# Patient Record
Sex: MALE | Race: WHITE | Employment: FULL TIME | URBAN - METROPOLITAN AREA
[De-identification: names, ages, dates, MRNs, and addresses within clinical notes are randomized per-mention and may not be internally consistent; named-entity substitution may affect disease eponyms.]

---

## 2020-01-31 ENCOUNTER — HOSPITAL ENCOUNTER (OUTPATIENT)
Age: 30
Setting detail: SPECIMEN
Discharge: HOME | End: 2020-01-31

## 2020-01-31 LAB
-: NORMAL
AMORPHOUS: NORMAL
BACTERIA: NORMAL
BILIRUBIN URINE: NEGATIVE
CASTS UA: NORMAL /LPF (ref 0–8)
COLOR: YELLOW
COMMENT UA: ABNORMAL
CRYSTALS, UA: NORMAL /HPF
EPITHELIAL CELLS UA: NORMAL /HPF (ref 0–5)
GLUCOSE URINE: NEGATIVE
KETONES, URINE: NEGATIVE
LEUKOCYTE ESTERASE, URINE: ABNORMAL
MUCUS: NORMAL
NITRITE, URINE: NEGATIVE
OTHER OBSERVATIONS UA: NORMAL
PH UA: 8 (ref 5–8)
PROTEIN UA: NEGATIVE
RBC UA: NORMAL /HPF (ref 0–4)
RENAL EPITHELIAL, UA: NORMAL /HPF
SPECIFIC GRAVITY UA: 1.02 (ref 1–1.03)
TRICHOMONAS: NORMAL
TURBIDITY: CLEAR
URINE HGB: NEGATIVE
UROBILINOGEN, URINE: NORMAL
WBC UA: NORMAL /HPF (ref 0–5)
YEAST: NORMAL

## 2020-02-01 LAB
CULTURE: NO GROWTH
Lab: NORMAL
SPECIMEN DESCRIPTION: NORMAL

## 2020-02-03 LAB
C. TRACHOMATIS DNA ,URINE: ABNORMAL
N. GONORRHOEAE DNA, URINE: NEGATIVE
SOURCE: NORMAL
SPECIMEN DESCRIPTION: ABNORMAL
TRICHOMONAS VAGINALI, MOLECULAR: NEGATIVE

## 2020-02-17 ENCOUNTER — HOSPITAL ENCOUNTER (OUTPATIENT)
Age: 30
Setting detail: SPECIMEN
Discharge: HOME | End: 2020-02-17

## 2020-02-17 LAB
ALBUMIN SERPL-MCNC: 4.7 G/DL (ref 3.5–5.2)
ALBUMIN/GLOBULIN RATIO: 1.8 (ref 1–2.5)
ALP BLD-CCNC: 57 U/L (ref 40–129)
ALT SERPL-CCNC: 11 U/L (ref 5–41)
ANION GAP SERPL CALCULATED.3IONS-SCNC: 12 MMOL/L (ref 9–17)
AST SERPL-CCNC: 16 U/L
BILIRUB SERPL-MCNC: 0.28 MG/DL (ref 0.3–1.2)
BUN BLDV-MCNC: 8 MG/DL (ref 6–20)
BUN/CREAT BLD: ABNORMAL (ref 9–20)
CALCIUM SERPL-MCNC: 9.3 MG/DL (ref 8.6–10.4)
CHLORIDE BLD-SCNC: 99 MMOL/L (ref 98–107)
CO2: 26 MMOL/L (ref 20–31)
CREAT SERPL-MCNC: 0.84 MG/DL (ref 0.7–1.2)
GFR AFRICAN AMERICAN: >60 ML/MIN
GFR NON-AFRICAN AMERICAN: >60 ML/MIN
GFR SERPL CREATININE-BSD FRML MDRD: ABNORMAL ML/MIN/{1.73_M2}
GFR SERPL CREATININE-BSD FRML MDRD: ABNORMAL ML/MIN/{1.73_M2}
GLUCOSE BLD-MCNC: 85 MG/DL (ref 70–99)
HCT VFR BLD CALC: 48.4 % (ref 40.7–50.3)
HEMOGLOBIN: 15.7 G/DL (ref 13–17)
HIV AG/AB: NONREACTIVE
MCH RBC QN AUTO: 29.3 PG (ref 25.2–33.5)
MCHC RBC AUTO-ENTMCNC: 32.4 G/DL (ref 28.4–34.8)
MCV RBC AUTO: 90.5 FL (ref 82.6–102.9)
NRBC AUTOMATED: 0 PER 100 WBC
PDW BLD-RTO: 12.2 % (ref 11.8–14.4)
PLATELET # BLD: 211 K/UL (ref 138–453)
PMV BLD AUTO: 11 FL (ref 8.1–13.5)
POTASSIUM SERPL-SCNC: 4.2 MMOL/L (ref 3.7–5.3)
RBC # BLD: 5.35 M/UL (ref 4.21–5.77)
SODIUM BLD-SCNC: 137 MMOL/L (ref 135–144)
T. PALLIDUM, IGG: NONREACTIVE
TOTAL PROTEIN: 7.3 G/DL (ref 6.4–8.3)
TSH SERPL DL<=0.05 MIU/L-ACNC: 1.38 MIU/L (ref 0.3–5)
WBC # BLD: 7.1 K/UL (ref 3.5–11.3)

## 2020-02-18 LAB
HERPES SIMPLEX VIRUS 1 IGG: 0.21
HERPES SIMPLEX VIRUS 2 IGG: 0.05
HERPES TYPE 1/2 IGM COMBINED: 1.36

## 2020-02-28 ENCOUNTER — HOSPITAL ENCOUNTER (OUTPATIENT)
Age: 30
Setting detail: SPECIMEN
Discharge: HOME | End: 2020-02-28

## 2020-03-01 LAB
CULTURE: ABNORMAL
DIRECT EXAM: ABNORMAL
DIRECT EXAM: ABNORMAL
Lab: ABNORMAL
SPECIMEN DESCRIPTION: ABNORMAL

## 2020-03-02 LAB
C. TRACHOMATIS DNA ,URINE: ABNORMAL
MEDIA TYPE: NORMAL
N. GONORRHOEAE DNA, URINE: NEGATIVE
SOURCE: NORMAL
SPECIMEN DESCRIPTION: ABNORMAL
TRICHOMONAS VAGINALIS BY TRANSCRIPTION-MEDIATED AMPLIFICATION: NEGATIVE

## 2020-03-12 ENCOUNTER — HOSPITAL ENCOUNTER (OUTPATIENT)
Age: 30
Setting detail: SPECIMEN
Discharge: HOME | End: 2020-03-12

## 2020-03-13 LAB
C. TRACHOMATIS DNA ,URINE: NEGATIVE
N. GONORRHOEAE DNA, URINE: NEGATIVE
SOURCE: NORMAL
SPECIMEN DESCRIPTION: NORMAL
TRICHOMONAS VAGINALI, MOLECULAR: NEGATIVE

## 2021-12-16 ENCOUNTER — HOSPITAL ENCOUNTER (EMERGENCY)
Age: 31
Discharge: HOME | End: 2021-12-16
Payer: COMMERCIAL

## 2021-12-16 VITALS
WEIGHT: 156 LBS | OXYGEN SATURATION: 99 % | TEMPERATURE: 98.6 F | SYSTOLIC BLOOD PRESSURE: 107 MMHG | RESPIRATION RATE: 16 BRPM | HEART RATE: 60 BPM | DIASTOLIC BLOOD PRESSURE: 58 MMHG

## 2021-12-16 DIAGNOSIS — Z20.2 POSSIBLE EXPOSURE TO STD: ICD-10-CM

## 2021-12-16 DIAGNOSIS — R36.9 PENILE DISCHARGE: Primary | ICD-10-CM

## 2021-12-16 LAB
BILIRUBIN URINE: NEGATIVE
BLOOD, URINE: ABNORMAL
CHARACTER, URINE: CLEAR
CHLAMYDIA TRACHOMATIS BY RT-PCR: DETECTED
COLOR: ABNORMAL
CT/NG SOURCE: ABNORMAL
GLUCOSE URINE: NEGATIVE MG/DL
KETONES, URINE: NEGATIVE
LEUKOCYTE ESTERASE, URINE: ABNORMAL
NEISSERIA GONORRHOEAE BY RT-PCR: DETECTED
NITRITE, URINE: NEGATIVE
PH UA: 6.5 (ref 5–9)
PROTEIN UA: NEGATIVE MG/DL
SPECIFIC GRAVITY UA: 1.01 (ref 1–1.03)
UROBILINOGEN, URINE: 0.2 EU/DL (ref 0.2–1)

## 2021-12-16 PROCEDURE — 87077 CULTURE AEROBIC IDENTIFY: CPT

## 2021-12-16 PROCEDURE — 6360000002 HC RX W HCPCS: Performed by: NURSE PRACTITIONER

## 2021-12-16 PROCEDURE — 87591 N.GONORRHOEAE DNA AMP PROB: CPT

## 2021-12-16 PROCEDURE — 2500000003 HC RX 250 WO HCPCS: Performed by: NURSE PRACTITIONER

## 2021-12-16 PROCEDURE — 99203 OFFICE O/P NEW LOW 30 MIN: CPT | Performed by: NURSE PRACTITIONER

## 2021-12-16 PROCEDURE — 6370000000 HC RX 637 (ALT 250 FOR IP): Performed by: NURSE PRACTITIONER

## 2021-12-16 PROCEDURE — 87491 CHLMYD TRACH DNA AMP PROBE: CPT

## 2021-12-16 PROCEDURE — 96372 THER/PROPH/DIAG INJ SC/IM: CPT

## 2021-12-16 PROCEDURE — 87086 URINE CULTURE/COLONY COUNT: CPT

## 2021-12-16 PROCEDURE — 81003 URINALYSIS AUTO W/O SCOPE: CPT

## 2021-12-16 PROCEDURE — 99213 OFFICE O/P EST LOW 20 MIN: CPT

## 2021-12-16 RX ORDER — AZITHROMYCIN 250 MG/1
1000 TABLET, FILM COATED ORAL ONCE
Status: COMPLETED | OUTPATIENT
Start: 2021-12-16 | End: 2021-12-16

## 2021-12-16 RX ADMIN — LIDOCAINE HYDROCHLORIDE 500 MG: 10 INJECTION, SOLUTION EPIDURAL; INFILTRATION; INTRACAUDAL; PERINEURAL at 10:06

## 2021-12-16 RX ADMIN — AZITHROMYCIN MONOHYDRATE 1000 MG: 250 TABLET ORAL at 10:06

## 2021-12-16 ASSESSMENT — ENCOUNTER SYMPTOMS
NAUSEA: 0
SHORTNESS OF BREATH: 0
VOMITING: 0

## 2021-12-16 NOTE — LETTER
[address] [facility]  [address] [place] [address]  Phone: [phone]             December 20, 2021    Patient: Bradford Flores   YOB: 1990   Date of Visit: 12/16/2021       To Whom It May Concern:    Bradford Flores was seen and treated in our emergency department on 12/16/2021. He may return on 12/18/2021.       Sincerely,             Signature:__________________________________

## 2021-12-16 NOTE — ED PROVIDER NOTES
[facility]  Urgent Care Encounter       CHIEF COMPLAINT       Chief Complaint   Patient presents with    Exposure to STD       Nurses Notes reviewed and I agree except as noted in the HPI. HISTORY OF PRESENT ILLNESS   Maral Delgado is a 32 y.o. male who presents for evaluation of possible STD. Patient states that he has had white penile discharge and dysuria for the past 4 to 5 days. He denies any fever, chills, testicular pain or swelling. He states that he has been sexually active with 1 female partner over the past 4 months and has not sure of an exact STD exposure but states that he has had chlamydia before and that this feels similar. The history is provided by the patient. REVIEW OF SYSTEMS     Review of Systems   Constitutional: Negative for chills and fever. Respiratory: Negative for shortness of breath. Cardiovascular: Negative for chest pain. Gastrointestinal: Negative for nausea and vomiting. Genitourinary: Positive for dysuria and penile discharge. Negative for scrotal swelling and testicular pain. Musculoskeletal: Negative for arthralgias and myalgias. Skin: Negative for rash. Neurological: Negative for headaches. PAST MEDICAL HISTORY   History reviewed. No pertinent past medical history. SURGICALHISTORY     Patient  has a past surgical history that includes Tonsillectomy. CURRENT MEDICATIONS       Previous Medications    No medications on file       ALLERGIES     Patient is is allergic to pcn [penicillins]. Patients   There is no immunization history on file for this patient. FAMILY HISTORY     Patient's family history includes Diabetes in his mother; High Blood Pressure in his mother; No Known Problems in his father. SOCIAL HISTORY     Patient  reports that he has never smoked. He has never used smokeless tobacco. He reports that he does not drink alcohol and does not use drugs.     PHYSICAL EXAM     ED TRIAGE VITALS  BP: (!) 101/53, Temp: 98.6 °F (37 °C), Pulse: 70, Resp: 18, SpO2: 99 %,There is no height or weight on file to calculate BMI.,No LMP for male patient. Physical Exam  Vitals and nursing note reviewed. Constitutional:       General: He is not in acute distress. Appearance: He is well-developed. He is not diaphoretic. Eyes:      Conjunctiva/sclera:      Right eye: Right conjunctiva is not injected. Left eye: Left conjunctiva is not injected. Pupils: Pupils are equal.   Cardiovascular:      Rate and Rhythm: Normal rate and regular rhythm. Heart sounds: No murmur heard. Pulmonary:      Effort: Pulmonary effort is normal. No respiratory distress. Breath sounds: Normal breath sounds. Abdominal:      General: Bowel sounds are normal.      Tenderness: There is no abdominal tenderness. Genitourinary:     Comments: Exam deferred and testing was performed  Musculoskeletal:      Cervical back: Normal range of motion. Right knee: Normal range of motion. Left knee: Normal range of motion. Skin:     General: Skin is warm. Findings: No rash. Neurological:      Mental Status: He is alert and oriented to person, place, and time. Psychiatric:         Behavior: Behavior normal.         DIAGNOSTIC RESULTS     Labs:No results found for this visit on 12/16/21. IMAGING:    No orders to display         EKG:      URGENT CARE COURSE:     Vitals:    12/16/21 0924   BP: (!) 101/53   Pulse: 70   Resp: 18   Temp: 98.6 °F (37 °C)   TempSrc: Temporal   SpO2: 99%   Weight: 156 lb (70.8 kg)       Medications   cefTRIAXone (ROCEPHIN) 500 mg in lidocaine 1 % 1 mL IM Injection (has no administration in time range)   azithromycin (ZITHROMAX) tablet 1,000 mg (has no administration in time range)            PROCEDURES:  None    FINAL IMPRESSION      1. Penile discharge    2.  Possible exposure to STD          DISPOSITION/ PLAN       Patient was treated for STDs based on his concerns and is advised remain free of any sexual activity until notified of results and that he must remain free of sexual activity for 2 weeks if he is positive. He is advised to follow-up on an outpatient basis as needed if symptoms worsen and he is agreeable to plan as discussed. PATIENT REFERRED TO:  STEFFANIE Moore - CNP  [address] [facility] 2nd Floor / [facility] [address]      DISCHARGE MEDICATIONS:  New Prescriptions    No medications on file       Discontinued Medications    No medications on file       There are no discharge medications for this patient.       STEFFANIE Kennedy - CNP    (Please note that portions of this note were completed with a voice recognition program. Efforts were made to edit the dictations but occasionally words are mis-transcribed.)          STEFFANIE Kennedy - CNP  12/16/21 7960

## 2021-12-20 LAB
ORGANISM: ABNORMAL
URINE CULTURE, ROUTINE: ABNORMAL
URINE CULTURE, ROUTINE: ABNORMAL

## 2022-01-10 ENCOUNTER — HOSPITAL ENCOUNTER (EMERGENCY)
Age: 32
Discharge: HOME | End: 2022-01-10
Payer: COMMERCIAL

## 2022-01-10 VITALS
DIASTOLIC BLOOD PRESSURE: 74 MMHG | RESPIRATION RATE: 16 BRPM | OXYGEN SATURATION: 98 % | SYSTOLIC BLOOD PRESSURE: 112 MMHG | HEART RATE: 71 BPM | TEMPERATURE: 97.8 F

## 2022-01-10 DIAGNOSIS — U07.1 COVID-19: Primary | ICD-10-CM

## 2022-01-10 DIAGNOSIS — Z20.822 ENCOUNTER FOR LABORATORY TESTING FOR COVID-19 VIRUS: ICD-10-CM

## 2022-01-10 LAB
FLU A ANTIGEN: NEGATIVE
FLU B ANTIGEN: NEGATIVE
SARS-COV-2, NAA: DETECTED

## 2022-01-10 PROCEDURE — 99213 OFFICE O/P EST LOW 20 MIN: CPT

## 2022-01-10 PROCEDURE — 87804 INFLUENZA ASSAY W/OPTIC: CPT

## 2022-01-10 PROCEDURE — 99213 OFFICE O/P EST LOW 20 MIN: CPT | Performed by: NURSE PRACTITIONER

## 2022-01-10 PROCEDURE — 87635 SARS-COV-2 COVID-19 AMP PRB: CPT

## 2022-01-10 RX ORDER — ACETAMINOPHEN 325 MG/1
650 TABLET ORAL EVERY 6 HOURS PRN
Qty: 120 TABLET | Refills: 3 | COMMUNITY
Start: 2022-01-10 | End: 2022-02-08

## 2022-01-10 ASSESSMENT — PAIN DESCRIPTION - LOCATION: LOCATION: OTHER (COMMENT)

## 2022-01-10 ASSESSMENT — ENCOUNTER SYMPTOMS
COUGH: 1
VOMITING: 0
NAUSEA: 1
ABDOMINAL PAIN: 0
SORE THROAT: 0
SHORTNESS OF BREATH: 0

## 2022-01-10 ASSESSMENT — PAIN DESCRIPTION - PAIN TYPE: TYPE: ACUTE PAIN

## 2022-01-10 ASSESSMENT — PAIN SCALES - GENERAL: PAINLEVEL_OUTOF10: 4

## 2022-01-10 ASSESSMENT — PAIN DESCRIPTION - FREQUENCY: FREQUENCY: CONTINUOUS

## 2022-01-10 ASSESSMENT — PAIN DESCRIPTION - DESCRIPTORS: DESCRIPTORS: ACHING

## 2022-01-10 NOTE — ED PROVIDER NOTES
[facility] 36  Urgent Care Encounter       CHIEF COMPLAINT       Chief Complaint   Patient presents with    Chills     onset Wednesday     Cough    Headache     onset Friday        Nurses Notes reviewed and I agree except as noted in the HPI. HISTORY OF PRESENT ILLNESS   Elizabeth Iglesia is a 32 y.o. male who presents with complaints of chills, cough, headache, body ache, and sweating. This is a new problem. He states his symptoms started Wednesday morning when he woke up. Symptoms slightly worsened on Friday. He called off work for the weekend. He states he did have nausea which resolved yesterday as well as his headache. His symptoms have been improving since that time. He denies any current fever, chills, shortness of breath, or chest pain. He has not taken anything for treatment    The history is provided by the patient. REVIEW OF SYSTEMS     Review of Systems   Constitutional: Positive for chills and fatigue. Negative for fever. HENT: Positive for congestion. Negative for sore throat. Respiratory: Positive for cough. Negative for shortness of breath. Cardiovascular: Negative for chest pain. Gastrointestinal: Positive for nausea. Negative for abdominal pain and vomiting. Musculoskeletal: Positive for myalgias. Neurological: Positive for weakness and headaches. PAST MEDICAL HISTORY   History reviewed. No pertinent past medical history. SURGICALHISTORY     Patient  has a past surgical history that includes Tonsillectomy. CURRENT MEDICATIONS       Discharge Medication List as of 1/10/2022  4:55 PM          ALLERGIES     Patient is is allergic to pcn [penicillins]. Patients   There is no immunization history on file for this patient. FAMILY HISTORY     Patient's family history includes Diabetes in his mother; High Blood Pressure in his mother; No Known Problems in his father. SOCIAL HISTORY     Patient  reports that he has never smoked.  He has 01/10/2022 04:55:44 PM     Discussed with the patient that exam is consistent with a viral illness and that I believe testing for coronavirus and influenza is warranted at this time. Test was completed during visit today and patient is advised to quarantine. Rapid influenza test was negative. Patient is advised to follow-up as directed by the health department. Advised to rest and hydrate and use over-the-counter antipyretic medications as needed for fever or body aches. Patient is advised to present to the ER for any worsening symptoms and is agreeable to plan as discussed.       PATIENT REFERRED TO:  STEFFANIE Irizarry CNP  [address] [place] [address] / [place] [id number]      DISCHARGE MEDICATIONS:  Discharge Medication List as of 1/10/2022  4:55 PM      START taking these medications    Details   acetaminophen (AMINOFEN) 325 MG tablet Take 2 tablets by mouth every 6 hours as needed for Pain, Disp-120 tablet, R-[id number]             Discharge Medication List as of 1/10/2022  4:55 PM          Discharge Medication List as of 1/10/2022  4:55 PM          STEFFANIE Rizo CNP    (Please note that portions of this note were completed with a voice recognition program. Efforts were made to edit the dictations but occasionally words are mis-transcribed.)            STEFFANIE Rizo CNP  01/10/22 0443

## 2022-01-10 NOTE — Clinical Note
Artemio Belcher was seen and treated in our emergency department on 1/10/2022. He may return to work on 01/11/2022. If you have any questions or concerns, please don't hesitate to call.       Jemma Jeffers, STEFFANIE - CNP

## 2022-02-08 ENCOUNTER — APPOINTMENT (OUTPATIENT)
Dept: CT IMAGING | Age: 32
End: 2022-02-08
Payer: COMMERCIAL

## 2022-02-08 ENCOUNTER — HOSPITAL ENCOUNTER (EMERGENCY)
Age: 32
Discharge: HOME | End: 2022-02-08
Payer: COMMERCIAL

## 2022-02-08 VITALS
HEART RATE: 62 BPM | WEIGHT: 153 LBS | BODY MASS INDEX: 20.28 KG/M2 | DIASTOLIC BLOOD PRESSURE: 75 MMHG | TEMPERATURE: 97.6 F | OXYGEN SATURATION: 98 % | SYSTOLIC BLOOD PRESSURE: 114 MMHG | RESPIRATION RATE: 18 BRPM | HEIGHT: 73 IN

## 2022-02-08 DIAGNOSIS — K29.00 ACUTE GASTRITIS WITHOUT HEMORRHAGE, UNSPECIFIED GASTRITIS TYPE: ICD-10-CM

## 2022-02-08 DIAGNOSIS — R10.13 ABDOMINAL PAIN, EPIGASTRIC: Primary | ICD-10-CM

## 2022-02-08 LAB
ALBUMIN SERPL-MCNC: 4.7 G/DL (ref 3.5–5.1)
ALP BLD-CCNC: 55 U/L (ref 38–126)
ALT SERPL-CCNC: 7 U/L (ref 11–66)
ANION GAP SERPL CALCULATED.3IONS-SCNC: 10 MEQ/L (ref 8–16)
AST SERPL-CCNC: 16 U/L (ref 5–40)
BASOPHILS # BLD: 0.3 %
BASOPHILS ABSOLUTE: 0 THOU/MM3 (ref 0–0.1)
BILIRUB SERPL-MCNC: 0.2 MG/DL (ref 0.3–1.2)
BUN BLDV-MCNC: 9 MG/DL (ref 7–22)
CALCIUM SERPL-MCNC: 9.1 MG/DL (ref 8.5–10.5)
CHLORIDE BLD-SCNC: 106 MEQ/L (ref 98–111)
CO2: 25 MEQ/L (ref 23–33)
CREAT SERPL-MCNC: 0.9 MG/DL (ref 0.4–1.2)
EOSINOPHIL # BLD: 3.6 %
EOSINOPHILS ABSOLUTE: 0.2 THOU/MM3 (ref 0–0.4)
ERYTHROCYTE [DISTWIDTH] IN BLOOD BY AUTOMATED COUNT: 12.3 % (ref 11.5–14.5)
ERYTHROCYTE [DISTWIDTH] IN BLOOD BY AUTOMATED COUNT: 39.4 FL (ref 35–45)
GFR SERPL CREATININE-BSD FRML MDRD: > 90 ML/MIN/1.73M2
GLUCOSE BLD-MCNC: 117 MG/DL (ref 70–108)
HCT VFR BLD CALC: 42.4 % (ref 42–52)
HEMOGLOBIN: 14.7 GM/DL (ref 14–18)
IMMATURE GRANS (ABS): 0 THOU/MM3 (ref 0–0.07)
IMMATURE GRANULOCYTES: 0 %
LIPASE: 24.8 U/L (ref 5.6–51.3)
LYMPHOCYTES # BLD: 25.4 %
LYMPHOCYTES ABSOLUTE: 1.7 THOU/MM3 (ref 1–4.8)
MCH RBC QN AUTO: 30.4 PG (ref 26–33)
MCHC RBC AUTO-ENTMCNC: 34.7 GM/DL (ref 32.2–35.5)
MCV RBC AUTO: 87.8 FL (ref 80–94)
MONOCYTES # BLD: 11.1 %
MONOCYTES ABSOLUTE: 0.7 THOU/MM3 (ref 0.4–1.3)
NUCLEATED RED BLOOD CELLS: 0 /100 WBC
OSMOLALITY CALCULATION: 281 MOSMOL/KG (ref 275–300)
PLATELET # BLD: 213 THOU/MM3 (ref 130–400)
PMV BLD AUTO: 10.2 FL (ref 9.4–12.4)
POTASSIUM REFLEX MAGNESIUM: 4 MEQ/L (ref 3.5–5.2)
RBC # BLD: 4.83 MILL/MM3 (ref 4.7–6.1)
SEG NEUTROPHILS: 59.6 %
SEGMENTED NEUTROPHILS ABSOLUTE COUNT: 4 THOU/MM3 (ref 1.8–7.7)
SODIUM BLD-SCNC: 141 MEQ/L (ref 135–145)
TOTAL PROTEIN: 7.2 G/DL (ref 6.1–8)
WBC # BLD: 6.7 THOU/MM3 (ref 4.8–10.8)

## 2022-02-08 PROCEDURE — 96375 TX/PRO/DX INJ NEW DRUG ADDON: CPT

## 2022-02-08 PROCEDURE — 99284 EMERGENCY DEPT VISIT MOD MDM: CPT

## 2022-02-08 PROCEDURE — 6370000000 HC RX 637 (ALT 250 FOR IP): Performed by: NURSE PRACTITIONER

## 2022-02-08 PROCEDURE — 74177 CT ABD & PELVIS W/CONTRAST: CPT

## 2022-02-08 PROCEDURE — 6360000004 HC RX CONTRAST MEDICATION: Performed by: NURSE PRACTITIONER

## 2022-02-08 PROCEDURE — 96374 THER/PROPH/DIAG INJ IV PUSH: CPT

## 2022-02-08 PROCEDURE — 6360000002 HC RX W HCPCS: Performed by: NURSE PRACTITIONER

## 2022-02-08 PROCEDURE — 83690 ASSAY OF LIPASE: CPT

## 2022-02-08 PROCEDURE — 85025 COMPLETE CBC W/AUTO DIFF WBC: CPT

## 2022-02-08 PROCEDURE — 36415 COLL VENOUS BLD VENIPUNCTURE: CPT

## 2022-02-08 PROCEDURE — 80053 COMPREHEN METABOLIC PANEL: CPT

## 2022-02-08 RX ORDER — ONDANSETRON 2 MG/ML
4 INJECTION INTRAMUSCULAR; INTRAVENOUS ONCE
Status: COMPLETED | OUTPATIENT
Start: 2022-02-08 | End: 2022-02-08

## 2022-02-08 RX ORDER — MORPHINE SULFATE 2 MG/ML
2 INJECTION, SOLUTION INTRAMUSCULAR; INTRAVENOUS ONCE
Status: COMPLETED | OUTPATIENT
Start: 2022-02-08 | End: 2022-02-08

## 2022-02-08 RX ORDER — ONDANSETRON 4 MG/1
4 TABLET, ORALLY DISINTEGRATING ORAL EVERY 8 HOURS PRN
Qty: 20 TABLET | Refills: 0 | Status: SHIPPED | OUTPATIENT
Start: 2022-02-08 | End: 2022-05-05

## 2022-02-08 RX ORDER — OMEPRAZOLE 40 MG/1
40 CAPSULE, DELAYED RELEASE ORAL
Qty: 30 CAPSULE | Refills: 0 | Status: SHIPPED | OUTPATIENT
Start: 2022-02-08 | End: 2022-08-18 | Stop reason: ALTCHOICE

## 2022-02-08 RX ADMIN — MORPHINE SULFATE 2 MG: 2 INJECTION, SOLUTION INTRAMUSCULAR; INTRAVENOUS at 13:44

## 2022-02-08 RX ADMIN — IOPAMIDOL 80 ML: 755 INJECTION, SOLUTION INTRAVENOUS at 14:18

## 2022-02-08 RX ADMIN — LIDOCAINE HYDROCHLORIDE: 20 SOLUTION ORAL; TOPICAL at 16:04

## 2022-02-08 RX ADMIN — ONDANSETRON 4 MG: 2 INJECTION INTRAMUSCULAR; INTRAVENOUS at 13:44

## 2022-02-08 ASSESSMENT — PAIN SCALES - GENERAL
PAINLEVEL_OUTOF10: 4
PAINLEVEL_OUTOF10: 5
PAINLEVEL_OUTOF10: 5

## 2022-02-08 ASSESSMENT — ENCOUNTER SYMPTOMS
BLOOD IN STOOL: 0
ABDOMINAL PAIN: 1
SHORTNESS OF BREATH: 0
NAUSEA: 1
VOMITING: 0
DIARRHEA: 1
CONSTIPATION: 0
COLOR CHANGE: 0
ABDOMINAL DISTENTION: 0

## 2022-02-08 ASSESSMENT — PAIN DESCRIPTION - DESCRIPTORS: DESCRIPTORS: DULL;PRESSURE

## 2022-02-08 ASSESSMENT — PAIN DESCRIPTION - LOCATION
LOCATION: ABDOMEN
LOCATION: ABDOMEN

## 2022-02-08 ASSESSMENT — PAIN DESCRIPTION - PAIN TYPE: TYPE: ACUTE PAIN

## 2022-02-08 ASSESSMENT — PAIN DESCRIPTION - FREQUENCY
FREQUENCY: INTERMITTENT
FREQUENCY: INTERMITTENT

## 2022-02-08 NOTE — ED NOTES
ED nurse-to-nurse bedside report    Chief Complaint   Patient presents with    Abdominal Pain      LOC: alert and orientated to name, place, date  Vital signs   Vitals:    02/08/22 1235   BP: 113/78   Pulse: 63   Resp: 16   Temp: 97.6 °F (36.4 °C)   TempSrc: Oral   SpO2: 98%   Weight: 153 lb (69.4 kg)   Height: 6' 1\" (1.854 m)      Pain:    Pain Interventions: none  Pain Goal: 2/10  Oxygen: No    Current needs required RA   Telemetry: No  LDAs:   Peripheral IV 02/08/22 Left Antecubital (Active)   Site Assessment Clean;Dry; Intact 02/08/22 1244   Line Status Blood return noted;Specimen collected; Flushed 02/08/22 1244   Dressing Status Clean;Dry; Intact 02/08/22 1244   Dressing Intervention New 02/08/22 1244     Continuous Infusions:   Mobility: Independent  Lovell Fall Risk Score: No flowsheet data found.   Report given to: Hugo Grande RN  02/08/22 6545

## 2022-02-08 NOTE — ED NOTES
Pt remains stable, resting in bed. Pt reminded urine sample is needed.      Judi Hinds RN  02/08/22 8349

## 2022-02-08 NOTE — ED NOTES
Pt is being DC home in stable condition.  DC instructions, follow up and prescriptions reviewed, pt verbalized understanding with no question for this RN     Mary Fine RN  02/08/22 1577

## 2022-02-08 NOTE — Clinical Note
Ana Kelsey was seen and treated in our emergency department on 2/8/2022. He may return to work on 02/09/2022. If you have any questions or concerns, please don't hesitate to call.       Elsa Johnson, STEFFANIE - CNP

## 2022-02-08 NOTE — Clinical Note
Charmaine Alvarez was seen and treated in our emergency department on 2/8/2022. He may return to work on 02/09/2022. If you have any questions or concerns, please don't hesitate to call.       Iliana Morgan, STEFFANIE - CNP

## 2022-02-08 NOTE — ED PROVIDER NOTES
 Number of children: None    Years of education: None    Highest education level: None   Occupational History    None   Tobacco Use    Smoking status: Never Smoker    Smokeless tobacco: Never Used   Vaping Use    Vaping Use: Every day    Substances: Nicotine   Substance and Sexual Activity    Alcohol use: Never    Drug use: Never    Sexual activity: Never   Other Topics Concern    None   Social History Narrative    None     Social Determinants of Health     Financial Resource Strain:     Difficulty of Paying Living Expenses: Not on file   Food Insecurity:     Worried About Running Out of Food in the Last Year: Not on file    Glen of Food in the Last Year: Not on file   Transportation Needs:     Lack of Transportation (Medical): Not on file    Lack of Transportation (Non-Medical): Not on file   Physical Activity:     Days of Exercise per Week: Not on file    Minutes of Exercise per Session: Not on file   Stress:     Feeling of Stress : Not on file   Social Connections:     Frequency of Communication with Friends and Family: Not on file    Frequency of Social Gatherings with Friends and Family: Not on file    Attends [employer]ism Services: Not on file    Active Member of [address] [place] [address] or Organizations: Not on file    Attends Club or Organization Meetings: Not on file    Marital Status: Not on file   Intimate Partner Violence:     Fear of Current or Ex-Partner: Not on file    Emotionally Abused: Not on file    Physically Abused: Not on file    Sexually Abused: Not on file   Housing Stability:     Unable to Pay for Housing in the Last Year: Not on file    Number of [place] in the Last Year: Not on file    Unstable Housing in the Last Year: Not on file       REVIEW OF SYSTEMS     Review of Systems   Constitutional: Positive for appetite change. Negative for chills, diaphoresis, fatigue and fever. Respiratory: Negative for shortness of breath.     Cardiovascular: Negative for chest pain and palpitations. Gastrointestinal: Positive for abdominal pain, diarrhea and nausea. Negative for abdominal distention, blood in stool, constipation and vomiting. Skin: Negative for color change. Neurological: Positive for dizziness and light-headedness. Negative for syncope and headaches. Except as noted above the remainder of the review of systems was reviewed and is negative. SCREENINGS                        PHYSICAL EXAM    (up to 7 for level 4, 8 or more for level 5)     ED Triage Vitals [02/08/22 1235]   BP Temp Temp Source Pulse Resp SpO2 Height Weight   113/78 97.6 °F (36.4 °C) Oral 63 16 98 % 6' 1\" (1.854 m) 153 lb (69.4 kg)       Physical Exam  Constitutional:       Appearance: He is well-developed. HENT:      Head: Normocephalic and atraumatic. Cardiovascular:      Rate and Rhythm: Normal rate and regular rhythm. Pulmonary:      Effort: Pulmonary effort is normal.      Breath sounds: Normal breath sounds. Abdominal:      General: Abdomen is flat. Bowel sounds are normal. There is no distension. Palpations: Abdomen is soft. Tenderness: There is abdominal tenderness in the epigastric area and left upper quadrant. There is no guarding or rebound. Positive signs include Whitman's sign. Negative signs include McBurney's sign, psoas sign and obturator sign. Skin:     General: Skin is warm and dry. Capillary Refill: Capillary refill takes less than 2 seconds. Neurological:      General: No focal deficit present. Mental Status: He is alert and oriented to person, place, and time. Psychiatric:         Behavior: Behavior is cooperative. DIAGNOSTIC RESULTS     EKG:(none if blank)  All EKGs are interpreted by the Emergency Department Physician who either signs or Co-signs this chart in the absence of a cardiologist.        RADIOLOGY: (none if blank)   I directly visualized the following images and reviewed the radiologist interpretations.   Interpretation per the Radiologist below, if available at the time of this note:  CT ABDOMEN PELVIS W IV CONTRAST Additional Contrast? None   Final Result   1. No acute bowel obstruction or acute inflammatory bowel process   2. Grade 1 spondylolisthesis of L5 as a result of bilateral spondylolysis. 3. Bilateral spondylolysis at L4. **This report has been created using voice recognition software. It may contain minor errors which are inherent in voice recognition technology. **      Final report electronically signed by Dr Fran Colin on 2/8/2022 2:55 PM          LABS:  Labs Reviewed   COMPREHENSIVE METABOLIC PANEL W/ REFLEX TO MG FOR LOW K - Abnormal; Notable for the following components:       Result Value    Glucose 117 (*)     Total Bilirubin 0.2 (*)     ALT 7 (*)     All other components within normal limits   CBC WITH AUTO DIFFERENTIAL   LIPASE   ANION GAP   GLOMERULAR FILTRATION RATE, ESTIMATED   OSMOLALITY   URINE RT REFLEX TO CULTURE       All other labs were within normal range or not returned as of this dictation. Please note, any cultures that may have been sent were not resulted at the time of this patient visit. EMERGENCY DEPARTMENT COURSE and Medical Decision Making:     Vitals:    Vitals:    02/08/22 1235 02/08/22 1321 02/08/22 1547   BP: 113/78 110/78 113/78   Pulse: 63 67 71   Resp: 16 18    Temp: 97.6 °F (36.4 °C)     TempSrc: Oral     SpO2: 98% 97% 98%   Weight: 153 lb (69.4 kg)     Height: 6' 1\" (1.854 m)         PROCEDURES: (None if blank)  Procedures    ED Course as of 02/08/22 1657   Tue Feb 08, 2022   1620 Reviewed case with Dr Kaela Melissa.  OK for discharge and follow-up with GI [NW]      ED Course User Index  [NW] STEFFANIE Castillo - CNP     MDM  Number of Diagnoses or Management Options  Abdominal pain, epigastric: new, needed workup     Amount and/or Complexity of Data Reviewed  Clinical lab tests: ordered and reviewed  Tests in the radiology section of CPT®: ordered and reviewed  Tests in the medicine section of CPT®: ordered and reviewed  Review and summarize past medical records: yes  Discuss the patient with other providers: yes  Independent visualization of images, tracings, or specimens: yes    Risk of Complications, Morbidity, and/or Mortality  Presenting problems: minimal  Diagnostic procedures: low  Management options: low    Critical Care  Total time providing critical care: < 30 minutes    Patient presents to ER with complaint of Carmine, vomiting and epigastric pain. Patient is concerned due to family history of diabetes and he is concerned for \"problems with his organs\". CTA of the abdomen was reassuring. Labs are reassuring. Patient was given Zofran which helped with his nausea. Patient was given a GI cocktail and this did help with the pain some. Patient be discharged home with follow-up with GI. Patient will be given prescription for omeprazole and for Zofran ODT. Patient return to ER for worsening symptoms  Strict return precautions and follow up instructions were discussed with the patient with which the patient agrees    ED Medications administered this visit:    Medications   ondansetron (ZOFRAN) injection 4 mg (4 mg IntraVENous Given 2/8/22 1344)   morphine (PF) injection 2 mg (2 mg IntraVENous Given 2/8/22 1344)   iopamidol (ISOVUE-370) 76 % injection 80 mL (80 mLs IntraVENous Given 2/8/22 1418)   aluminum & magnesium hydroxide-simethicone (MAALOX) 30 mL, lidocaine viscous hcl (XYLOCAINE) 5 mL (GI COCKTAIL) ( Oral Given 2/8/22 1604)         FINAL IMPRESSION      1. Abdominal pain, epigastric    2.  Acute gastritis without hemorrhage, unspecified gastritis type          DISPOSITION/PLAN   DISPOSITION    Discharged    PATIENT REFERRED TO:  MD Venessa Sun [address] [place] [address] [phone]    Schedule an appointment as soon as possible for a visit         DISCHARGE MEDICATIONS:  New Prescriptions    OMEPRAZOLE (PRILOSEC) 40 MG DELAYED RELEASE CAPSULE Take 1 capsule by mouth every morning (before breakfast)    ONDANSETRON (ZOFRAN ODT) 4 MG DISINTEGRATING TABLET    Take 1 tablet by mouth every 8 hours as needed for Nausea              STEFFANIE Portillo CNP (electronically signed)            STEFFANIE Portillo CNP  [facility]22 [facility]  STEFFANIE Lopez CNP  02/08/22 [address] STEFFANIE Banks CNP  [facility]22 [facility]  STEFFANIE Lopez CNP  02/08/22 2012

## 2022-02-08 NOTE — ED TRIAGE NOTES
Pt ambulatory to ER from [facility] with c/o LUQ pain x 1 week. Pt describes pain as a dull pressure that comes in waves. Pt used medical marijuana 'oils' for pain. Pt reports diarrhea today and nausea.

## 2022-05-05 ENCOUNTER — HOSPITAL ENCOUNTER (EMERGENCY)
Age: 32
Discharge: HOME | End: 2022-05-05
Payer: COMMERCIAL

## 2022-05-05 VITALS
BODY MASS INDEX: 20.01 KG/M2 | TEMPERATURE: 97.6 F | OXYGEN SATURATION: 98 % | SYSTOLIC BLOOD PRESSURE: 109 MMHG | HEART RATE: 68 BPM | RESPIRATION RATE: 16 BRPM | WEIGHT: 151 LBS | HEIGHT: 73 IN | DIASTOLIC BLOOD PRESSURE: 68 MMHG

## 2022-05-05 DIAGNOSIS — J06.9 VIRAL URI WITH COUGH: Primary | ICD-10-CM

## 2022-05-05 PROCEDURE — 99213 OFFICE O/P EST LOW 20 MIN: CPT | Performed by: NURSE PRACTITIONER

## 2022-05-05 PROCEDURE — 99213 OFFICE O/P EST LOW 20 MIN: CPT

## 2022-05-05 RX ORDER — GUAIFENESIN, PSEUDOEPHEDRINE HYDROCHLORIDE 600; 60 MG/1; MG/1
1 TABLET, EXTENDED RELEASE ORAL EVERY 12 HOURS PRN
Qty: 14 TABLET | Refills: 0 | Status: SHIPPED | OUTPATIENT
Start: 2022-05-05 | End: 2022-05-19

## 2022-05-05 RX ORDER — FLUTICASONE PROPIONATE 50 MCG
1 SPRAY, SUSPENSION (ML) NASAL DAILY
Qty: 16 G | Refills: 0 | Status: SHIPPED | OUTPATIENT
Start: 2022-05-05 | End: 2022-08-18 | Stop reason: ALTCHOICE

## 2022-05-05 RX ORDER — CETIRIZINE HYDROCHLORIDE 10 MG/1
10 TABLET ORAL DAILY
Qty: 30 TABLET | Refills: 0 | Status: SHIPPED | OUTPATIENT
Start: 2022-05-05 | End: 2022-06-04

## 2022-05-05 ASSESSMENT — ENCOUNTER SYMPTOMS
COUGH: 1
SORE THROAT: 1
RHINORRHEA: 1
CHEST TIGHTNESS: 0
SHORTNESS OF BREATH: 0
DIARRHEA: 0
NAUSEA: 0
VOMITING: 0

## 2022-05-05 ASSESSMENT — PAIN DESCRIPTION - LOCATION: LOCATION: THROAT

## 2022-05-05 ASSESSMENT — PAIN DESCRIPTION - DESCRIPTORS: DESCRIPTORS: SORE

## 2022-05-05 ASSESSMENT — PAIN SCALES - GENERAL: PAINLEVEL_OUTOF10: 7

## 2022-05-05 ASSESSMENT — PAIN - FUNCTIONAL ASSESSMENT: PAIN_FUNCTIONAL_ASSESSMENT: 0-10

## 2022-05-05 NOTE — ED PROVIDER NOTES
[facility] 36  Urgent Care Encounter       CHIEF COMPLAINT       Chief Complaint   Patient presents with    Cough    Pharyngitis    Nasal Congestion       Nurses Notes reviewed and I agree except as noted in the HPI. HISTORY OF PRESENT ILLNESS   Brown Foley is a 32 y.o. male who presents to the [facility] urgent care for evaluation of cough. He reports his symptoms started yesterday. He reports associated symptoms of nasal congestion, rhinorrhea, postnasal drainage, pharyngitis and cough. He denies fever or chills. Denies nausea, vomiting, diarrhea. Denies headache, dizziness, or blurred vision. The history is provided by the patient. No  was used. REVIEW OF SYSTEMS     Review of Systems   Constitutional: Negative for activity change, appetite change, chills, fatigue and fever. HENT: Positive for congestion, postnasal drip, rhinorrhea and sore throat. Negative for ear discharge and ear pain. Respiratory: Positive for cough. Negative for chest tightness and shortness of breath. Cardiovascular: Negative for chest pain. Gastrointestinal: Negative for diarrhea, nausea and vomiting. Genitourinary: Negative for dysuria. Skin: Negative for rash. Allergic/Immunologic: Negative for environmental allergies and food allergies. Neurological: Negative for dizziness and headaches. PAST MEDICAL HISTORY         Diagnosis Date    COVID-19 01/10/2022       SURGICALHISTORY     Patient  has a past surgical history that includes Tonsillectomy. CURRENT MEDICATIONS       Discharge Medication List as of 5/5/2022 12:13 PM      CONTINUE these medications which have NOT CHANGED    Details   omeprazole (PRILOSEC) 40 MG delayed release capsule Take 1 capsule by mouth every morning (before breakfast), Disp-30 capsule, R-0Normal             ALLERGIES     Patient is is allergic to pcn [penicillins].     Patients   There is no immunization history on file for this patient. FAMILY HISTORY     Patient's family history includes Diabetes in his mother; High Blood Pressure in his mother; No Known Problems in his father. SOCIAL HISTORY     Patient  reports that he has never smoked. He has never used smokeless tobacco. He reports that he does not drink alcohol and does not use drugs. PHYSICAL EXAM     ED TRIAGE VITALS  BP: 109/68, Temp: 97.6 °F (36.4 °C), Pulse: 68, Resp: 16, SpO2: 98 %,Estimated body mass index is 19.92 kg/m² as calculated from the following:    Height as of this encounter: 6' 1\" (1.854 m). Weight as of this encounter: 151 lb (68.5 kg). ,No LMP for male patient. Physical Exam  Vitals and nursing note reviewed. Constitutional:       General: He is not in acute distress. Appearance: Normal appearance. He is not ill-appearing, toxic-appearing or diaphoretic. HENT:      Head: Normocephalic. Right Ear: Ear canal and external ear normal.      Left Ear: Ear canal and external ear normal.      Nose: Nose normal. No congestion or rhinorrhea. Mouth/Throat:      Mouth: Mucous membranes are moist.      Pharynx: Oropharynx is clear. No oropharyngeal exudate or posterior oropharyngeal erythema. Cardiovascular:      Rate and Rhythm: Normal rate. Pulses: Normal pulses. Pulmonary:      Effort: Pulmonary effort is normal. No respiratory distress. Breath sounds: No stridor. No wheezing or rhonchi. Abdominal:      General: Abdomen is flat. Bowel sounds are normal.      Palpations: Abdomen is soft. Musculoskeletal:         General: No swelling or tenderness. Normal range of motion. Cervical back: Normal range of motion. Neurological:      General: No focal deficit present. Mental Status: He is alert and oriented to person, place, and time.    Psychiatric:         Mood and Affect: Mood normal.         Behavior: Behavior normal.         DIAGNOSTIC RESULTS     Labs:No results found for this visit on 05/05/22. IMAGING:    No orders to display         EKG: None      URGENT CARE COURSE:     Vitals:    05/05/22 1200   BP: 109/68   Pulse: 68   Resp: 16   Temp: 97.6 °F (36.4 °C)   SpO2: 98%   Weight: 151 lb (68.5 kg)   Height: 6' 1\" (1.854 m)       Medications - No data to display         PROCEDURES:  None    FINAL IMPRESSION      1. Viral URI with cough          DISPOSITION/ PLAN     Patient seen and evaluated for the above symptoms. Assessment consistent with likely acute viral URI with cough. He is provided a prescription for Zyrtec, Flonase, Mucinex D. Instructed follow-up with his PCP in 3 to 5 days and worsening symptoms. Instructed use over-the-counter Tylenol and Motrin for pain or fever. He is agreeable to above plan and denies questions or concerns at this time.       PATIENT REFERRED TO:  STEFFANIE Borjas CNP  [address] / [facility] [address]      DISCHARGE MEDICATIONS:  Discharge Medication List as of 5/5/2022 12:13 PM      START taking these medications    Details   cetirizine (ZYRTEC) 10 MG tablet Take 1 tablet by mouth daily, Disp-30 tablet, R-0Normal      fluticasone (FLONASE) 50 MCG/ACT nasal spray 1 spray by Each Nostril route daily, Disp-16 g, R-0Normal      pseudoephedrine-guaiFENesin (MUCINEX D)  MG per extended release tablet Take 1 tablet by mouth every 12 hours as needed for Congestion, Disp-14 tablet, R-0Normal             Discharge Medication List as of 5/5/2022 12:13 PM      STOP taking these medications       ondansetron (ZOFRAN ODT) 4 MG disintegrating tablet Comments:   Reason for Stopping:               Discharge Medication List as of 5/5/2022 12:13 PM          STEFFANIE Hampton CNP    (Please note that portions of this note were completed with a voice recognition program. Efforts were made to edit the dictations but occasionally words are mis-transcribed.)           STEFFANIE Hampton CNP  05/05/22 1329

## 2022-05-05 NOTE — ED NOTES
Pt ambulatory to room 4 with complaints of cough congestion and sore throat     Michael Jarvis RN  05/05/22 7675

## 2022-05-05 NOTE — Clinical Note
Darcie Vasques was seen and treated in our emergency department on 5/5/2022. He may return to work on 05/07/2022. May be off school or work one to two days if needed. If you have any questions or concerns, please don't hesitate to call.       Michael Em, APRN - CNP

## 2022-08-18 ENCOUNTER — HOSPITAL ENCOUNTER (EMERGENCY)
Age: 32
Discharge: HOME | End: 2022-08-18
Payer: COMMERCIAL

## 2022-08-18 VITALS
RESPIRATION RATE: 18 BRPM | HEART RATE: 72 BPM | TEMPERATURE: 98 F | BODY MASS INDEX: 20.45 KG/M2 | SYSTOLIC BLOOD PRESSURE: 117 MMHG | OXYGEN SATURATION: 99 % | DIASTOLIC BLOOD PRESSURE: 61 MMHG | WEIGHT: 155 LBS

## 2022-08-18 DIAGNOSIS — U07.1 COVID-19: Primary | ICD-10-CM

## 2022-08-18 LAB — SARS-COV-2, NAA: DETECTED

## 2022-08-18 PROCEDURE — 99213 OFFICE O/P EST LOW 20 MIN: CPT

## 2022-08-18 PROCEDURE — 99213 OFFICE O/P EST LOW 20 MIN: CPT | Performed by: NURSE PRACTITIONER

## 2022-08-18 PROCEDURE — 87635 SARS-COV-2 COVID-19 AMP PRB: CPT

## 2022-08-18 RX ORDER — CLINDAMYCIN HYDROCHLORIDE 300 MG/1
300 CAPSULE ORAL 3 TIMES DAILY
COMMUNITY
End: 2022-10-20 | Stop reason: ALTCHOICE

## 2022-08-18 RX ORDER — ONDANSETRON 4 MG/1
4 TABLET, ORALLY DISINTEGRATING ORAL EVERY 8 HOURS PRN
Qty: 12 TABLET | Refills: 0 | Status: SHIPPED | OUTPATIENT
Start: 2022-08-18 | End: 2022-08-22

## 2022-08-18 ASSESSMENT — ENCOUNTER SYMPTOMS
COUGH: 0
SHORTNESS OF BREATH: 0
SORE THROAT: 1

## 2022-08-18 NOTE — ED TRIAGE NOTES
Patient to room with c/o headache, sore throat, and chills upon waking this morning. Nasopharyngeal COVID swab obtained. Patient tolerated well. Requests work excuse.

## 2022-08-18 NOTE — LETTER
Adrian Caroline was seen and treated in our emergency department on 8/18/2022. He may return to work on 08/24/2022. If you have any questions or concerns, please don't hesitate to call.       Eber January, APRN - CNP

## 2022-08-18 NOTE — ED PROVIDER NOTES
[facility] 36  Urgent Care Encounter       CHIEF COMPLAINT       Chief Complaint   Patient presents with    Headache     Sore throat, chills       Nurses Notes reviewed and I agree except as noted in the HPI. HISTORY OF PRESENT ILLNESS   Kain Silverio is a 32 y.o. male who presents complaints of a headache, sore throat, and chills. His symptoms started this morning around 5 hours ago. This is a new problem. He denies any shortness of breath or chest pain. He has not taken anything for the headaches or symptom management. He denies any known exposure to illness. The history is provided by the patient. REVIEW OF SYSTEMS     Review of Systems   Constitutional:  Positive for chills and fatigue. Negative for fever. HENT:  Positive for sore throat. Negative for congestion. Respiratory:  Negative for cough and shortness of breath. Cardiovascular:  Negative for chest pain. Musculoskeletal:  Positive for myalgias. Neurological:  Positive for headaches. PAST MEDICAL HISTORY         Diagnosis Date    COVID-19 01/10/2022       SURGICALHISTORY     Patient  has a past surgical history that includes Tonsillectomy. CURRENT MEDICATIONS       Discharge Medication List as of 8/18/2022  2:21 PM        CONTINUE these medications which have NOT CHANGED    Details   clindamycin (CLEOCIN) 300 MG capsule Take 300 mg by mouth 3 times dailyHistorical Med             ALLERGIES     Patient is is allergic to pcn [penicillins]. Patients   There is no immunization history on file for this patient. FAMILY HISTORY     Patient's family history includes Diabetes in his mother; High Blood Pressure in his mother; No Known Problems in his father. SOCIAL HISTORY     Patient  reports that he has never smoked. He has never used smokeless tobacco. He reports that he does not drink alcohol and does not use drugs.     PHYSICAL EXAM     ED TRIAGE VITALS  BP: 117/61, Temp: 98 °F (36.7 °C), Heart Rate: 72, Resp: 18, SpO2: 99 %,Estimated body mass index is 20.45 kg/m² as calculated from the following:    Height as of 5/5/22: 6' 1\" (1.854 m). Weight as of this encounter: 155 lb (70.3 kg). ,No LMP for male patient. Physical Exam  Vitals and nursing note reviewed. Constitutional:       General: He is not in acute distress. Appearance: He is ill-appearing. HENT:      Nose: Nose normal.      Mouth/Throat:      Mouth: Mucous membranes are moist.   Cardiovascular:      Rate and Rhythm: Normal rate. Pulmonary:      Effort: Pulmonary effort is normal.   Skin:     General: Skin is warm and dry. Neurological:      Mental Status: He is alert and oriented to person, place, and time. Gait: Gait normal.       DIAGNOSTIC RESULTS     Labs:  Results for orders placed or performed during the hospital encounter of 08/18/22   COVID-19, Rapid   Result Value Ref Range    SARS-CoV-2, ROAJS DETECTED (AA) NOT DETECTED       IMAGING:  None    EKG:  None    URGENT CARE COURSE:     Vitals:    08/18/22 1354   BP: 117/61   Pulse: 72   Resp: 18   Temp: 98 °F (36.7 °C)   TempSrc: Temporal   SpO2: 99%   Weight: 155 lb (70.3 kg)       Medications - No data to display       PROCEDURES:  None    FINAL IMPRESSION      1. COVID-19      DISPOSITION/ PLAN   DISPOSITION Decision To Discharge 08/18/2022 02:19:38 PM     Discussed with the patient that exam is consistent with a viral illness and that I believe testing for coronavirus is warranted at this time. Test was completed during visit today and patient is advised to quarantine due to a positive COVID result. Patient is advised to follow-up as directed by the health department. Advised to rest and hydrate and use over-the-counter antipyretic medications as needed for fever or body aches. Patient is advised to present to the ER for any worsening symptoms and is agreeable to plan as discussed.       PATIENT REFERRED TO:  STEFFANIE Jacobson - [facility]  [address] [facility] 2nd Floor / [facility] [address]      DISCHARGE MEDICATIONS:  Discharge Medication List as of 8/18/2022  2:21 PM          Discharge Medication List as of 8/18/2022  2:21 PM          Discharge Medication List as of 8/18/2022  2:21 PM          STEFFANIE Mendoza CNP    (Please note that portions of this note were completed with a voice recognition program. Efforts were made to edit the dictations but occasionally words are mis-transcribed.)           STEFFANIE Mendoza CNP  08/18/22 3090

## 2022-10-20 ENCOUNTER — HOSPITAL ENCOUNTER (EMERGENCY)
Age: 32
Discharge: HOME | End: 2022-10-20
Attending: EMERGENCY MEDICINE
Payer: COMMERCIAL

## 2022-10-20 VITALS
HEART RATE: 65 BPM | DIASTOLIC BLOOD PRESSURE: 66 MMHG | WEIGHT: 155 LBS | TEMPERATURE: 97.2 F | RESPIRATION RATE: 16 BRPM | SYSTOLIC BLOOD PRESSURE: 117 MMHG | BODY MASS INDEX: 20.54 KG/M2 | HEIGHT: 73 IN | OXYGEN SATURATION: 98 %

## 2022-10-20 DIAGNOSIS — K02.9 DENTAL CARIES: ICD-10-CM

## 2022-10-20 DIAGNOSIS — K08.89 PAIN, DENTAL: ICD-10-CM

## 2022-10-20 DIAGNOSIS — K04.7 DENTAL ABSCESS: Primary | ICD-10-CM

## 2022-10-20 PROCEDURE — 99213 OFFICE O/P EST LOW 20 MIN: CPT

## 2022-10-20 PROCEDURE — 99213 OFFICE O/P EST LOW 20 MIN: CPT | Performed by: EMERGENCY MEDICINE

## 2022-10-20 RX ORDER — CLINDAMYCIN HYDROCHLORIDE 150 MG/1
150 CAPSULE ORAL 3 TIMES DAILY
Qty: 30 CAPSULE | Refills: 0 | Status: SHIPPED | OUTPATIENT
Start: 2022-10-20 | End: 2022-10-30

## 2022-10-20 RX ORDER — IBUPROFEN 600 MG/1
600 TABLET ORAL 3 TIMES DAILY PRN
Qty: 15 TABLET | Refills: 0 | Status: SHIPPED | OUTPATIENT
Start: 2022-10-20 | End: 2022-11-09 | Stop reason: ALTCHOICE

## 2022-10-20 ASSESSMENT — ENCOUNTER SYMPTOMS
NAUSEA: 0
STRIDOR: 0
SINUS PRESSURE: 0
VOMITING: 0
BACK PAIN: 0
SHORTNESS OF BREATH: 0
WHEEZING: 0
ROS SKIN COMMENTS: NO RASH OR BRUISING
COUGH: 0
EYE DISCHARGE: 0
DIARRHEA: 0
TROUBLE SWALLOWING: 0
SORE THROAT: 0
EYE REDNESS: 0
VOICE CHANGE: 0
ABDOMINAL PAIN: 0
EYE PAIN: 0

## 2022-10-20 ASSESSMENT — PAIN SCALES - GENERAL: PAINLEVEL_OUTOF10: 10

## 2022-10-20 ASSESSMENT — PAIN DESCRIPTION - ONSET: ONSET: ON-GOING

## 2022-10-20 ASSESSMENT — PAIN - FUNCTIONAL ASSESSMENT: PAIN_FUNCTIONAL_ASSESSMENT: 0-10

## 2022-10-20 ASSESSMENT — PAIN DESCRIPTION - DESCRIPTORS: DESCRIPTORS: TENDER;THROBBING

## 2022-10-20 ASSESSMENT — PAIN DESCRIPTION - FREQUENCY: FREQUENCY: CONTINUOUS

## 2022-10-20 ASSESSMENT — PAIN DESCRIPTION - PAIN TYPE: TYPE: ACUTE PAIN;CHRONIC PAIN

## 2022-10-20 ASSESSMENT — PAIN DESCRIPTION - LOCATION: LOCATION: MOUTH

## 2022-10-20 NOTE — Clinical Note
Alyson Victoria was seen and treated in our emergency department on 10/20/2022. He may return to work on 10/22/2022. No work October 20 and October 21, 2022     If you have any questions or concerns, please don't hesitate to call.       Lucy Siddiqui MD

## 2022-10-20 NOTE — Clinical Note
Tess Palacios was seen and treated in our emergency department on 10/20/2022. He may return to work on 10/22/2022. No work October 20 and October 21, 2022     If you have any questions or concerns, please don't hesitate to call.       Susan De La Torre MD

## 2022-10-20 NOTE — ED PROVIDER NOTES
[place] [address]  Urgent Care Encounter      CHIEF COMPLAINT       Chief Complaint   Patient presents with    Dental Pain       Nurses Notes reviewed and I agree except as noted in the HPI. HISTORY OF PRESENT ILLNESS   Mary Jo Sharma is a 28 y.o. male who presents with anterior upper dental pain that he rates at 10 out of 10  in severity, associated with tender and swollen gums, sx identical to previous dental abscess. Patient has required antibiotics intermittently over the last several months unable to see a dentist.  Patient requesting PCP referral.  No fever, vomiting, stridor, headache, neck pain or neck stiffness, rash,  symptoms. No chest pain or syncope. No shortness of breath. No diabetes, non-smoker. REVIEW OF SYSTEMS     Review of Systems   Constitutional:  Positive for appetite change. Negative for chills, fatigue, fever and unexpected weight change. Decreased appetite no fever   HENT:  Positive for dental problem. Negative for congestion, ear discharge, ear pain, sinus pressure, sneezing, sore throat, trouble swallowing and voice change. Dental pain swelling of the gums   Eyes:  Negative for pain, discharge and redness. No redness or drainage   Respiratory:  Negative for cough, shortness of breath, wheezing and stridor. No cough or shortness of breath   Cardiovascular:  Negative for chest pain and leg swelling. No chest pain or syncope   Gastrointestinal:  Negative for abdominal pain, diarrhea, nausea and vomiting. No abdominal pain or abdominal   Genitourinary:  Negative for dysuria, frequency, hematuria and urgency. Musculoskeletal:  Negative for arthralgias, back pain, myalgias and neck pain. Skin:  Negative for rash. No rash or bruising   Neurological:  Negative for dizziness, syncope, weakness and headaches. No headache or lethargy   Hematological:  Negative for adenopathy.    Psychiatric/Behavioral:  Negative for behavioral problems, confusion, sleep disturbance and suicidal ideas. The patient is not nervous/anxious. Red and bold elements reviewed  PAST MEDICAL HISTORY         Diagnosis Date    COVID-19 01/10/2022       SURGICAL HISTORY     Patient  has a past surgical history that includes Tonsillectomy. CURRENT MEDICATIONS       Discharge Medication List as of 10/20/2022  2:55 PM          ALLERGIES     Patient is is allergic to pcn [penicillins]. FAMILY HISTORY     Patient'sfamily history includes Diabetes in his mother; High Blood Pressure in his mother; No Known Problems in his father. SOCIAL HISTORY     Patient  reports that he has never smoked. He has never used smokeless tobacco. He reports current drug use. Drug: Marijuana Lynnette Palm). He reports that he does not drink alcohol. PHYSICAL EXAM     ED TRIAGE VITALS  BP: 117/66, Temp: 97.2 °F (36.2 °C), Heart Rate: 65, Resp: 16, SpO2: 98 %  Physical Exam  Vitals and nursing note reviewed. Constitutional:       General: He is not in acute distress. Appearance: He is well-developed. He is not ill-appearing. Comments: Moist membranes normal airway   HENT:      Head: Normocephalic and atraumatic. Right Ear: External ear normal.      Left Ear: External ear normal.      Nose: Nose normal.      Mouth/Throat:      Dentition: Abnormal dentition. Dental tenderness, gingival swelling, dental caries and dental abscesses present. Pharynx: No oropharyngeal exudate. Comments: Dental caries and tender gums. No airway compromise or Demetrio's angina  Eyes:      General: No scleral icterus. Right eye: No discharge. Left eye: No discharge. Extraocular Movements:      Right eye: Normal extraocular motion. Left eye: Normal extraocular motion. Conjunctiva/sclera: Conjunctivae normal.      Pupils: Pupils are equal, round, and reactive to light. Comments: Conjunctiva clear   Neck:      Thyroid: No thyromegaly. Vascular: No JVD. Comments: No Meningismus  Cardiovascular:      Rate and Rhythm: Normal rate and regular rhythm. Pulses: Normal pulses. Heart sounds: Normal heart sounds, S1 normal and S2 normal. No murmur heard. No friction rub. No gallop. Comments: No murmur  Pulmonary:      Effort: Pulmonary effort is normal. No tachypnea or respiratory distress. Breath sounds: Normal breath sounds. No stridor. No decreased breath sounds, wheezing, rhonchi or rales. Comments: No Cough lungs clear  Chest:      Chest wall: No tenderness. Abdominal:      General: Bowel sounds are normal. There is no distension. Palpations: Abdomen is soft. There is no mass. Tenderness: There is no abdominal tenderness. There is no guarding or rebound. Musculoskeletal:         General: No tenderness. Normal range of motion. Cervical back: Normal range of motion. No spinous process tenderness or muscular tenderness. Comments: Extremities normal   Lymphadenopathy:      Cervical: No cervical adenopathy. Right cervical: No superficial cervical adenopathy. Left cervical: No superficial cervical adenopathy. Skin:     General: Skin is warm and dry. Findings: No erythema or rash. Comments: No Rash or bruising   Neurological:      Mental Status: He is alert and oriented to person, place, and time. Cranial Nerves: No cranial nerve deficit. Motor: No abnormal muscle tone. Coordination: Coordination normal.      Deep Tendon Reflexes: Reflexes are normal and symmetric. Reflexes normal.   Psychiatric:         Behavior: Behavior normal.         Thought Content: Thought content normal.         Judgment: Judgment normal.       DIAGNOSTIC RESULTS   Labs:No results found for this visit on 10/20/22.     IMAGING:  No orders to display      URGENT CARE COURSE:     Vitals:    10/20/22 1411   BP: 117/66   Pulse: 65   Resp: 16   Temp: 97.2 °F (36.2 °C)   TempSrc: Temporal   SpO2: 98% Weight: 155 lb (70.3 kg)   Height: 6' 1\" (1.854 m)       Medications - No data to display  PROCEDURES:  None  FINAL IMPRESSION      1. Dental abscess    2. Pain, dental    3. Dental caries        DISPOSITION/PLAN   DISPOSITION Decision To Discharge 10/20/2022 02:49:10 PM  Nontoxic, well-hydrated, normal airway. No airway abscess or epiglottitis, sepsis, CNS infection, pneumonia, hypoxia, bronchospasm. No Demetrio's angina. Patient has dental abscess. Will treat with clindamycin, Motrin, Tylenol, antiseptic gargles, increased oral clear liquids, rest.  Patient to follow-up with family medicine with appointment made for 5 days, and patient given dental referral list to follow-up with dentist ASAP. Patient understands to go to ED if worse. PATIENT REFERRED TO:  [address] [place] [address],[address]  [address] [place] [address]  In 5 days  Follow-up with family medicine clinic in 5 days, go to emergency if worse  DISCHARGE MEDICATIONS:  Discharge Medication List as of 10/20/2022  2:55 PM        START taking these medications    Details   clindamycin (CLEOCIN) 150 MG capsule Take 1 capsule by mouth 3 times daily for 10 days, Disp-30 capsule, R-0Print      ibuprofen (IBU) 600 MG tablet Take 1 tablet by mouth 3 times daily as needed for Pain or Fever (Take with food 3 times daily for pain or fever), Disp-15 tablet, R-0Print           Discharge Medication List as of 10/20/2022  2:55 PM          MD Davion Simms MD  10/20/22 6374

## 2022-10-20 NOTE — ED NOTES
Discharge instructions and prescriptions reviewed with pt. Pt verbalized understanding. Pt ambulated out in stable condition. Assessment unchanged upon discharge.      Tamika Verma RN  10/20/22 7105

## 2022-10-25 ENCOUNTER — NURSE ONLY (OUTPATIENT)
Dept: LAB | Age: 32
End: 2022-10-25

## 2022-10-25 ENCOUNTER — OFFICE VISIT (OUTPATIENT)
Dept: FAMILY MEDICINE CLINIC | Age: 32
End: 2022-10-25
Payer: COMMERCIAL

## 2022-10-25 VITALS
SYSTOLIC BLOOD PRESSURE: 118 MMHG | HEIGHT: 72 IN | WEIGHT: 148 LBS | OXYGEN SATURATION: 99 % | BODY MASS INDEX: 20.05 KG/M2 | HEART RATE: 65 BPM | TEMPERATURE: 98.1 F | DIASTOLIC BLOOD PRESSURE: 68 MMHG

## 2022-10-25 DIAGNOSIS — R63.4 WEIGHT LOSS: Primary | ICD-10-CM

## 2022-10-25 DIAGNOSIS — K04.7 DENTAL ABSCESS: ICD-10-CM

## 2022-10-25 DIAGNOSIS — R04.0 BLOODY NOSE: ICD-10-CM

## 2022-10-25 DIAGNOSIS — R63.4 WEIGHT LOSS: ICD-10-CM

## 2022-10-25 DIAGNOSIS — Z01.20 NEED FOR ASSESSMENT BY DENTISTRY FOR POOR DENTITION: ICD-10-CM

## 2022-10-25 LAB
ALBUMIN SERPL-MCNC: 4.5 G/DL (ref 3.5–5.1)
ALP BLD-CCNC: 46 U/L (ref 38–126)
ALT SERPL-CCNC: 13 U/L (ref 11–66)
ANION GAP SERPL CALCULATED.3IONS-SCNC: 6 MEQ/L (ref 8–16)
AST SERPL-CCNC: 16 U/L (ref 5–40)
AVERAGE GLUCOSE: 93 MG/DL (ref 70–126)
BASOPHILS # BLD: 0.4 %
BASOPHILS ABSOLUTE: 0 THOU/MM3 (ref 0–0.1)
BILIRUB SERPL-MCNC: 0.3 MG/DL (ref 0.3–1.2)
BUN BLDV-MCNC: 9 MG/DL (ref 7–22)
CALCIUM SERPL-MCNC: 8.8 MG/DL (ref 8.5–10.5)
CHLORIDE BLD-SCNC: 103 MEQ/L (ref 98–111)
CHOLESTEROL, TOTAL: 149 MG/DL (ref 100–199)
CO2: 28 MEQ/L (ref 23–33)
CREAT SERPL-MCNC: 0.9 MG/DL (ref 0.4–1.2)
EOSINOPHIL # BLD: 4.6 %
EOSINOPHILS ABSOLUTE: 0.3 THOU/MM3 (ref 0–0.4)
ERYTHROCYTE [DISTWIDTH] IN BLOOD BY AUTOMATED COUNT: 12.4 % (ref 11.5–14.5)
ERYTHROCYTE [DISTWIDTH] IN BLOOD BY AUTOMATED COUNT: 40.1 FL (ref 35–45)
FOLATE: 6 NG/ML (ref 4.8–24.2)
GFR SERPL CREATININE-BSD FRML MDRD: > 60 ML/MIN/1.73M2
GLUCOSE BLD-MCNC: 87 MG/DL (ref 70–108)
HBA1C MFR BLD: 5.1 % (ref 4.4–6.4)
HCT VFR BLD CALC: 42.2 % (ref 42–52)
HDLC SERPL-MCNC: 54 MG/DL
HEMOGLOBIN: 14.1 GM/DL (ref 14–18)
HEPATITIS C ANTIBODY: NEGATIVE
IMMATURE GRANS (ABS): 0.02 THOU/MM3 (ref 0–0.07)
IMMATURE GRANULOCYTES: 0.3 %
LDL CHOLESTEROL CALCULATED: 78 MG/DL
LYMPHOCYTES # BLD: 33 %
LYMPHOCYTES ABSOLUTE: 2.2 THOU/MM3 (ref 1–4.8)
MCH RBC QN AUTO: 29.4 PG (ref 26–33)
MCHC RBC AUTO-ENTMCNC: 33.4 GM/DL (ref 32.2–35.5)
MCV RBC AUTO: 88.1 FL (ref 80–94)
MONOCYTES # BLD: 9.6 %
MONOCYTES ABSOLUTE: 0.6 THOU/MM3 (ref 0.4–1.3)
NUCLEATED RED BLOOD CELLS: 0 /100 WBC
PLATELET # BLD: 211 THOU/MM3 (ref 130–400)
PMV BLD AUTO: 10.2 FL (ref 9.4–12.4)
POTASSIUM SERPL-SCNC: 4 MEQ/L (ref 3.5–5.2)
RBC # BLD: 4.79 MILL/MM3 (ref 4.7–6.1)
SEG NEUTROPHILS: 52.1 %
SEGMENTED NEUTROPHILS ABSOLUTE COUNT: 3.5 THOU/MM3 (ref 1.8–7.7)
SODIUM BLD-SCNC: 137 MEQ/L (ref 135–145)
TOTAL PROTEIN: 7 G/DL (ref 6.1–8)
TRIGL SERPL-MCNC: 84 MG/DL (ref 0–199)
VITAMIN B-12: 355 PG/ML (ref 211–911)
VITAMIN D 25-HYDROXY: 19 NG/ML (ref 30–100)
WBC # BLD: 6.7 THOU/MM3 (ref 4.8–10.8)

## 2022-10-25 PROCEDURE — 1036F TOBACCO NON-USER: CPT

## 2022-10-25 PROCEDURE — G8427 DOCREV CUR MEDS BY ELIG CLIN: HCPCS

## 2022-10-25 PROCEDURE — 99203 OFFICE O/P NEW LOW 30 MIN: CPT

## 2022-10-25 PROCEDURE — G8484 FLU IMMUNIZE NO ADMIN: HCPCS

## 2022-10-25 PROCEDURE — G8420 CALC BMI NORM PARAMETERS: HCPCS

## 2022-10-25 RX ORDER — IBUPROFEN 800 MG/1
TABLET ORAL
COMMUNITY
Start: 2022-08-16

## 2022-10-25 RX ORDER — CHLORHEXIDINE GLUCONATE 0.12 MG/ML
15 RINSE ORAL 2 TIMES DAILY
Qty: 420 ML | Refills: 0 | Status: SHIPPED | OUTPATIENT
Start: 2022-10-25 | End: 2022-11-08

## 2022-10-25 SDOH — ECONOMIC STABILITY: FOOD INSECURITY: WITHIN THE PAST 12 MONTHS, YOU WORRIED THAT YOUR FOOD WOULD RUN OUT BEFORE YOU GOT MONEY TO BUY MORE.: SOMETIMES TRUE

## 2022-10-25 SDOH — ECONOMIC STABILITY: FOOD INSECURITY: WITHIN THE PAST 12 MONTHS, THE FOOD YOU BOUGHT JUST DIDN'T LAST AND YOU DIDN'T HAVE MONEY TO GET MORE.: SOMETIMES TRUE

## 2022-10-25 ASSESSMENT — PATIENT HEALTH QUESTIONNAIRE - PHQ9
2. FEELING DOWN, DEPRESSED OR HOPELESS: 0
SUM OF ALL RESPONSES TO PHQ9 QUESTIONS 1 & 2: 0
1. LITTLE INTEREST OR PLEASURE IN DOING THINGS: 0
SUM OF ALL RESPONSES TO PHQ QUESTIONS 1-9: 0

## 2022-10-25 ASSESSMENT — SOCIAL DETERMINANTS OF HEALTH (SDOH): HOW HARD IS IT FOR YOU TO PAY FOR THE VERY BASICS LIKE FOOD, HOUSING, MEDICAL CARE, AND HEATING?: VERY HARD

## 2022-10-25 NOTE — LETTER
[address] [place] [address] [address]  [facility] [address]  Phone: [phone]  Fax: Leon Quinn 27, DO October 25, 2022     [facility]  [address] [facility] [address], [address]    Patient: Carlo Negron   MR Number: [id number]   YOB: 1990   Date of Visit: 10/25/2022       To whom this may concern,    I am referring my patient, Carlo Negron, to you for evaluation of poor dentition and dental abscess (previously). He  has a past medical history of COVID-19. His  has a past surgical history that includes Tonsillectomy. He  reports that he has never smoked. He has never used smokeless tobacco. He reports current drug use. Drug: Marijuana Amy Han). He reports that he does not drink alcohol. He has a current medication list which includes the following prescription(s): ibuprofen, chlorhexidine, clindamycin, and ibuprofen. He is allergic to pcn [penicillins]. I appreciate your assistance in his care and look forward to your findings and recommendations.     Sincerely,                           Mitzy Sykes DO

## 2022-10-25 NOTE — LETTER
[address] [place] [address],[address] [place] [address]  Phone: [phone]  Fax: [address]  [facility] [address]  Phone: [phone]  Fax: [phone]    October 25, 2022     [facility]  [address] [facility]  [address] [facility], [address]    Patient: Jessica Gustafson   MR Number: [id number]   YOB: 1990   Date of Visit: 10/25/2022       To whom this may concern,    I am referring my patient, Jessica Gustafson, to you for evaluation of poor dentition and dental abscess (previously). He  has a past medical history of COVID-19. His  has a past surgical history that includes Tonsillectomy. He  reports that he has never smoked. He has never used smokeless tobacco. He reports current drug use. Drug: Marijuana [place]et [place]). He reports that he does not drink alcohol. He has a current medication list which includes the following prescription(s): ibuprofen, chlorhexidine, clindamycin, and ibuprofen. He is allergic to pcn [penicillins]. I appreciate your assistance in his care and look forward to your findings and recommendations.     Sincerely,                           Leny Alvarez, DO

## 2022-10-25 NOTE — LETTER
10/25/2022        Jessica Gustafson  [facility] [address]      EXCUSE FROM WORK OR SCHOOL    To Whom It May Concern:    Jessica Gustafson, date of birth 1990, is currently under the care of Margaret Ambriz. For medical reasons, please excuse him from work or school for the following time period:    First day out: 10/25/22   Return on: 10/26/22   Restrictions upon return: No restrictions     Please have the patient contact our office if there are questions or concerns.       Sincerely,      Leny Alvarez, DO

## 2022-10-25 NOTE — PROGRESS NOTES
S: 28 y.o. male with   Chief Complaint   Patient presents with    New Patient       Chief complaint, [place], and all pertinent details of the case reviewed with the resident. Please see resident's note for specific details discussed at today's visit. Works 1pm -2am, has to care for daughter on Tuesday and Thursday mornings starting at 8:00 till leaves for work. Trouble waking up. Missed his dental f/u appointment. Has lost 15 pounds recently. PMH: Chlaymdial urethritis in 12/21    BP Readings from Last 3 Encounters:   10/25/22 118/68   10/20/22 117/66   08/18/22 117/61     Wt Readings from Last 3 Encounters:   10/25/22 148 lb (67.1 kg)   10/20/22 155 lb (70.3 kg)   08/18/22 155 lb (70.3 kg)       O: VS:  height is 6' (1.829 m) and weight is 148 lb (67.1 kg). His temporal temperature is 98.1 °F (36.7 °C). His blood pressure is 118/68 and his pulse is 65. His oxygen saturation is 99%. AAO/NAD, appropriate affect for mood, thin, pale  Heent:  many molars missing or carious. No current abcess visible  Random glucose 2/22 was 117  A:     Diagnosis Orders   1. Weight loss  CBC with Auto Differential    Comprehensive Metabolic Panel    Hemoglobin A1C    Vitamin B12 & Folate    Vitamin D 25 Hydroxy    HIV Screen    Hepatitis C Antibody    RPR      2. Dental abscess        3. Need for assessment by dentistry for poor dentition        4. Bloody nose            Plan: Will give letter to patient to take to dentist to help him getting the appointment rescheduled. Trying to get dentures so able to eat better  Encourage pt to get a more \"invasive\" alarm clock with additional vibrations/light etc. So he will feel more comfortable going to sleep even for a few hours when he has to wake up early.   Encourage good sleep hygiene, but not sufficient with his current schedule, can use melatonin if has adequate sleep time , but often doesn't, at next visit goof recommend the brand midnight, which doesn't require mor than 3 hours  Ensure shakes to boost nutritional intake  Chorhexidene rinses bid to improve oral hygiene while waiting for dentist  Labs: cbc,cmp,a1c,B12,Vit D hiv and Hep c, will add RPR as STI last December and not tested then or since then  F/u in 1 month, offer flu shot    Health Maintenance Due   Topic Date Due    COVID-19 Vaccine (1) Never done    Varicella vaccine (1 of 2 - 2-dose childhood series) Never done    Depression Screen  Never done    Hepatitis C screen  Never done    DTaP/Tdap/Td vaccine (1 - Tdap) Never done    Flu vaccine (1) Never done       Attending Physician Statement  I have discussed the case, including pertinent history and exam findings with the resident. I also have seen the patient and performed key portions of the examination. I agree with the documented assessment and plan as documented by the resident.         Ishan Chavez MD 10/25/2022 10:25 AM

## 2022-10-25 NOTE — PROGRESS NOTES
Lillie Alva (:  1990) is a 28 y.o. male,New patient, here for evaluation of the following chief complaint(s):  New Patient         ASSESSMENT/PLAN:  1. Weight loss  -     CBC with Auto Differential; Future  -     Comprehensive Metabolic Panel; Future  -     Hemoglobin A1C; Future  -     Vitamin B12 & Folate; Future  -     Vitamin D 25 Hydroxy; Future  -     HIV Screen; Future  -     Hepatitis C Antibody; Future  -     RPR; Future  Patient has lost about 15 pounds in the past few months. At this time given that he has not followed with PCP in several years, will get comprehensive set of labs to determine if patient is anemic or has any underlying issues. 2. Dental abscess   Patient has a extensive history of poor dentition starting at a young age. He most recently was treated for dental abscess this past month in urgent care and was started on clindamycin. During exam he was noted to again have poor dentition but gums appear to be intact. Wrote a letter for referral to a dentist to get him in ASAP and we will start him on chlorhexidine mouthwash. 3. Need for assessment by dentistry for poor dentition   See #2 for plan  4. Bloody nose   Patient was noted to have bloody noses for 1 week straight with a medium amount of blood coming from his nose. He states there is nothing this because its onset no trauma to the area. At this time we will check basic labs to determine if he is anemic and if there is any underlying disorder going on. Return in about 1 month (around 2022). Subjective   SUBJECTIVE/OBJECTIVE:  Betsey Gutierrez is a 59-year-old male who presents to [facility] care. Patient states that he notes his teeth have been deteriorating since he was 3years old and has never really taking care of them. He started to have increasing dental issues about a year ago but did not have insurance at that time.   He notes that at that time his teeth started falling apart and he was finally able to get insurance and saw dentist who recommended to put a fixed denture. However he missed the appointment for the evaluation due to a lack of sleep and therefore was not able to be seen until January. He recently went to urgent care this month for dental abscess was treated with clindamycin. Due to his dental issues, he is had a decreased appetite because he is simply having a hard time eating food. Of note the patient has lost about 15 pounds the past few months and states this is likely due to the lack of eating and poor appetite due to his dental issues. Patient has an extensive history of lack of sleep. He works from 1 PM to around 2:30 AM and often does not fall asleep until around 4:56 AM and then gets up around noon to then go back to work. On Tuesdays he has custody of his daughter and is unable to get any sleep due to his irregular sleep cycle and schedule. He states this all started about February when he got custody of his kids. Review of Systems   Constitutional:  Positive for appetite change, fatigue and unexpected weight change. HENT:  Positive for dental problem and nosebleeds. Objective   Physical Exam  Constitutional:       Appearance: Normal appearance. HENT:      Head: Normocephalic and atraumatic. Mouth/Throat:      Comments: Poor dentition  Cardiovascular:      Rate and Rhythm: Normal rate and regular rhythm. Pulses: Normal pulses. Heart sounds: Normal heart sounds. Pulmonary:      Effort: Pulmonary effort is normal.      Breath sounds: Normal breath sounds. Abdominal:      General: Abdomen is flat. Bowel sounds are normal.      Palpations: Abdomen is soft. Neurological:      Mental Status: He is alert.           On this date 10/25/2022 I have spent 60 minutes reviewing previous notes, test results and face to face with the patient discussing the diagnosis and importance of compliance with the treatment plan as well as documenting on the day of the visit. An electronic signature was used to authenticate this note.     --Mathew Khanna, DO

## 2022-10-25 NOTE — LETTER
[address] [place] [address] [address]  [facility] [address]  Phone: [phone]  Fax: Leon Quinn 27, DO October 25, 2022     [facility]  [address] [facility] [address], [address]    Patient: Amarilis Espinal   MR Number: [id number]   YOB: 1990   Date of Visit: 10/25/2022       To whom this may concern,    I am referring my patient, Amarilis Espinal, to you for evaluation of poor dentition and dental abscess (previously). He  has a past medical history of COVID-19. His  has a past surgical history that includes Tonsillectomy. He  reports that he has never smoked. He has never used smokeless tobacco. He reports current drug use. Drug: Marijuana Belinda Forte). He reports that he does not drink alcohol. He has a current medication list which includes the following prescription(s): ibuprofen, chlorhexidine, clindamycin, and ibuprofen. He is allergic to pcn [penicillins]. I appreciate your assistance in his care and look forward to your findings and recommendations.     Sincerely,                           Asha Azul DO

## 2022-10-25 NOTE — PROGRESS NOTES
Health Maintenance Due   Topic Date Due    COVID-19 Vaccine (1) Never done    Varicella vaccine (1 of 2 - 2-dose childhood series) Never done    Depression Screen  Never done    Hepatitis C screen  Never done    DTaP/Tdap/Td vaccine (1 - Tdap) Never done    Flu vaccine (1) Never done

## 2022-10-26 LAB — HIV AG/AB: NONREACTIVE

## 2022-11-01 ENCOUNTER — TELEPHONE (OUTPATIENT)
Dept: FAMILY MEDICINE CLINIC | Age: 32
End: 2022-11-01

## 2022-11-01 NOTE — TELEPHONE ENCOUNTER
----- Message from Mitzy Sykes DO sent at 10/31/2022  7:04 PM EDT -----  Please call the patient and inform him that his lab results are normal with the exception of his Vitamin D level which is low. He can take over the counter vitamin D supplements for this once a day. Thank you.      Dr. Gabriella Torres  ----- Message -----  From: Kavita Weber Incoming Lab Results From [facility]  Sent: 10/25/2022  12:39 PM EDT  To: Mitzy Sykes DO

## 2022-11-09 ENCOUNTER — HOSPITAL ENCOUNTER (EMERGENCY)
Age: 32
Discharge: HOME | End: 2022-11-09
Payer: COMMERCIAL

## 2022-11-09 VITALS
SYSTOLIC BLOOD PRESSURE: 108 MMHG | HEIGHT: 73 IN | BODY MASS INDEX: 19.09 KG/M2 | DIASTOLIC BLOOD PRESSURE: 73 MMHG | WEIGHT: 144 LBS | RESPIRATION RATE: 16 BRPM | TEMPERATURE: 98.2 F | HEART RATE: 55 BPM | OXYGEN SATURATION: 98 %

## 2022-11-09 DIAGNOSIS — M54.41 ACUTE RIGHT-SIDED LOW BACK PAIN WITH RIGHT-SIDED SCIATICA: Primary | ICD-10-CM

## 2022-11-09 DIAGNOSIS — J30.9 ALLERGIC RHINITIS, UNSPECIFIED SEASONALITY, UNSPECIFIED TRIGGER: ICD-10-CM

## 2022-11-09 PROCEDURE — 99213 OFFICE O/P EST LOW 20 MIN: CPT | Performed by: NURSE PRACTITIONER

## 2022-11-09 PROCEDURE — 99213 OFFICE O/P EST LOW 20 MIN: CPT

## 2022-11-09 RX ORDER — CYCLOBENZAPRINE HCL 10 MG
10 TABLET ORAL 3 TIMES DAILY PRN
Qty: 15 TABLET | Refills: 0 | Status: SHIPPED | OUTPATIENT
Start: 2022-11-09 | End: 2022-11-15

## 2022-11-09 RX ORDER — CLINDAMYCIN HYDROCHLORIDE 300 MG/1
300 CAPSULE ORAL 3 TIMES DAILY
COMMUNITY
End: 2022-11-15

## 2022-11-09 RX ORDER — PREDNISONE 20 MG/1
20 TABLET ORAL DAILY
Qty: 5 TABLET | Refills: 0 | Status: SHIPPED | OUTPATIENT
Start: 2022-11-09 | End: 2022-11-14

## 2022-11-09 ASSESSMENT — ENCOUNTER SYMPTOMS
SINUS PRESSURE: 0
COUGH: 0
ABDOMINAL PAIN: 0
EYE REDNESS: 0
VOMITING: 0
SORE THROAT: 0
TROUBLE SWALLOWING: 0
RHINORRHEA: 1
DIARRHEA: 0
SINUS PAIN: 0
NAUSEA: 0
EYE DISCHARGE: 0
BACK PAIN: 1
SHORTNESS OF BREATH: 0

## 2022-11-09 ASSESSMENT — PAIN - FUNCTIONAL ASSESSMENT: PAIN_FUNCTIONAL_ASSESSMENT: 0-10

## 2022-11-09 ASSESSMENT — PAIN DESCRIPTION - DESCRIPTORS: DESCRIPTORS: ACHING

## 2022-11-09 ASSESSMENT — PAIN DESCRIPTION - LOCATION: LOCATION: NECK;HIP

## 2022-11-09 ASSESSMENT — PAIN SCALES - GENERAL: PAINLEVEL_OUTOF10: 8

## 2022-11-09 NOTE — ED PROVIDER NOTES
[place] [id number]  Urgent Care Encounter      CHIEF COMPLAINT       Chief Complaint   Patient presents with    Neck Pain    Hip Pain    Nasal Congestion       Nurses Notes reviewed and I agree except as noted in the HPI. HISTORY OF PRESENT ILLNESS   Bill Huynh is a 28 y.o. male who presents for evaluation of right lower back/hip pain and sinus congestion. Onset today, unchanged. No known injury. History of chronic intermittent low back/left hip pain in the past.  Patient notes injuries to the back when he was younger. Patient notes frequent visits to the chiropractic office in the past.    Pain is aching/sharp, intermittent. Rates 8/10. No leg weakness, numbness/tingling, loss of bowel or bladder control. No treatment prior to arrival.    Patient also complains of sinus congestion. No fever. No otalgia, otorrhea. No exposure to COVID, strep, flu. Possible concern for allergies. No treatment prior to arrival.    Patient needing work excuse. REVIEW OF SYSTEMS     Review of Systems   Constitutional:  Negative for chills, diaphoresis, fatigue and fever. HENT:  Positive for congestion, postnasal drip and rhinorrhea. Negative for ear pain, sinus pressure, sinus pain, sore throat and trouble swallowing. Eyes:  Negative for discharge and redness. Respiratory:  Negative for cough and shortness of breath. Cardiovascular:  Negative for chest pain. Gastrointestinal:  Negative for abdominal pain, diarrhea, nausea and vomiting. Genitourinary:  Negative for decreased urine volume, difficulty urinating, dysuria, flank pain, frequency, genital sores, hematuria, penile discharge, penile pain, penile swelling, scrotal swelling, testicular pain and urgency. Musculoskeletal:  Positive for arthralgias and back pain. Negative for gait problem, joint swelling, neck pain and neck stiffness. Skin:  Negative for rash. Neurological:  Negative for headaches.    Hematological: Negative for adenopathy. Psychiatric/Behavioral:  Negative for sleep disturbance. PAST MEDICAL HISTORY         Diagnosis Date    COVID-19 01/10/2022       SURGICAL HISTORY     Patient  has a past surgical history that includes Tonsillectomy. CURRENT MEDICATIONS       Discharge Medication List as of 11/9/2022  4:26 PM        CONTINUE these medications which have NOT CHANGED    Details   clindamycin (CLEOCIN) 300 MG capsule Take 300 mg by mouth 3 times dailyHistorical Med      ibuprofen (ADVIL;MOTRIN) 800 MG tablet TAKE 1 TABLET BY MOUTH WITH FOOD EVERY 8 HOURS AS NEEDED FOR PAINHistorical Med             ALLERGIES     Patient is is allergic to pcn [penicillins]. FAMILY HISTORY     Patient'sfamily history includes Diabetes in his mother; High Blood Pressure in his mother; No Known Problems in his father. SOCIAL HISTORY     Patient  reports that he has never smoked. He has never used smokeless tobacco. He reports current drug use. Drug: Marijuana Aloma Roller). He reports that he does not drink alcohol. PHYSICAL EXAM     ED TRIAGE VITALS  BP: 108/73, Temp: 98.2 °F (36.8 °C), Heart Rate: 55, Resp: 16, SpO2: 98 %  Physical Exam  Vitals and nursing note reviewed. Constitutional:       General: He is not in acute distress. Appearance: Normal appearance. He is well-developed. He is not ill-appearing, toxic-appearing or diaphoretic. HENT:      Head: Normocephalic and atraumatic. Jaw: No trismus. Right Ear: Hearing, tympanic membrane, ear canal and external ear normal. No mastoid tenderness. No hemotympanum. Tympanic membrane is not perforated, erythematous or bulging. Left Ear: Hearing, tympanic membrane, ear canal and external ear normal. No mastoid tenderness. No hemotympanum. Tympanic membrane is not perforated, erythematous or bulging. Nose: Nose normal.      Mouth/Throat:      Mouth: Mucous membranes are moist.      Pharynx: Oropharynx is clear. Uvula midline.       Tonsils: 0 on the right. 0 on the left. Eyes:      General: No scleral icterus. Conjunctiva/sclera: Conjunctivae normal.   Neck:      Thyroid: No thyromegaly. Trachea: Trachea normal.   Cardiovascular:      Rate and Rhythm: Normal rate and regular rhythm. No extrasystoles are present. Chest Wall: PMI is not displaced. Heart sounds: Normal heart sounds. No murmur heard. No friction rub. No gallop. Pulmonary:      Effort: Pulmonary effort is normal. No accessory muscle usage or respiratory distress. Breath sounds: Normal breath sounds. Musculoskeletal:      Cervical back: Normal range of motion and neck supple. Thoracic back: Normal.      Lumbar back: Tenderness (right paraspinal, radiates to right SI joint) present. No swelling, edema, deformity or bony tenderness. Normal range of motion. Negative right straight leg raise test and negative left straight leg raise test.      Right hip: Normal.      Left hip: Normal.   Lymphadenopathy:      Head:      Right side of head: No submental, submandibular, tonsillar, preauricular, posterior auricular or occipital adenopathy. Left side of head: No submental, submandibular, tonsillar, preauricular, posterior auricular or occipital adenopathy. Cervical: No cervical adenopathy. Upper Body:      Right upper body: No supraclavicular adenopathy. Left upper body: No supraclavicular adenopathy. Skin:     General: Skin is warm and dry. Coloration: Skin is not pale. Findings: No bruising, ecchymosis, erythema or rash. Comments: Skin intact, warm and dry to touch, no rashes noted on exposed surfaces. Neurological:      Mental Status: He is alert and oriented to person, place, and time. He is not disoriented. Psychiatric:         Mood and Affect: Mood normal.         Behavior: Behavior is cooperative. DIAGNOSTIC RESULTS   Labs:No results found for this visit on 11/09/22.     IMAGING:  No orders to display      URGENT CARE COURSE:     Vitals:    11/09/22 1604   BP: 108/73   Pulse: 55   Resp: 16   Temp: 98.2 °F (36.8 °C)   SpO2: 98%   Weight: 144 lb (65.3 kg)   Height: 6' 1\" (1.854 m)       Medications - No data to display  PROCEDURES:  None  FINAL IMPRESSION      1. Acute right-sided low back pain with right-sided sciatica    2. Allergic rhinitis, unspecified seasonality, unspecified trigger        DISPOSITION/PLAN   DISPOSITION Decision To Discharge 11/09/2022 04:24:51 PM    Nontoxic, no distress. Exam consistent with acute low back pain with sciatica. No cauda equina. Medication as prescribed. Over-the-counter meds for pain. Conservative treatment discussed. Consider chiropractic care. If symptoms worsen go to ER. He also presents with sinus congestion, secondary to allergies. Over-the-counter allergy medication as needed. Follow-up with PCP as scheduled. If any distress go to ER. PATIENT REFERRED TO:  STEFFANIE Branch CNP  [address]  [phone]      Follow-up with PCP as needed. Follow-up with chiropractic office as needed. Medication as prescribed. No driving up to 8 hours after Flexeril. Alternate heat/ice. Gentle stretching 3 times daily. If symptoms worsen go to ER.     DISCHARGE MEDICATIONS:  Discharge Medication List as of 11/9/2022  4:26 PM        START taking these medications    Details   cyclobenzaprine (FLEXERIL) 10 MG tablet Take 1 tablet by mouth 3 times daily as needed for Muscle spasms, Disp-15 tablet, R-0Normal      predniSONE (DELTASONE) 20 MG tablet Take 1 tablet by mouth daily for 5 days, Disp-5 tablet, R-0Normal           Discharge Medication List as of 11/9/2022  4:26 PM          [address] [facility], STEFFANIE - CNP  11/09/22 0847

## 2022-11-09 NOTE — ED TRIAGE NOTES
To room with c/o left trapezius pain that started this am. Right hip pain. He awoke with nasal congestion.

## 2022-11-09 NOTE — Clinical Note
Mike Driver was seen and treated in our emergency department on 11/9/2022. He may return to work on 11/10/2022. If you have any questions or concerns, please don't hesitate to call.       Aida Villela, APRN - CNP

## 2022-11-15 ENCOUNTER — HOSPITAL ENCOUNTER (EMERGENCY)
Age: 32
Discharge: HOME | End: 2022-11-15
Payer: COMMERCIAL

## 2022-11-15 VITALS
BODY MASS INDEX: 20.05 KG/M2 | TEMPERATURE: 97.8 F | WEIGHT: 152 LBS | DIASTOLIC BLOOD PRESSURE: 71 MMHG | OXYGEN SATURATION: 99 % | RESPIRATION RATE: 16 BRPM | HEART RATE: 79 BPM | SYSTOLIC BLOOD PRESSURE: 110 MMHG

## 2022-11-15 DIAGNOSIS — J00 ACUTE RHINITIS: Primary | ICD-10-CM

## 2022-11-15 PROCEDURE — 99213 OFFICE O/P EST LOW 20 MIN: CPT

## 2022-11-15 PROCEDURE — 99213 OFFICE O/P EST LOW 20 MIN: CPT | Performed by: NURSE PRACTITIONER

## 2022-11-15 RX ORDER — CETIRIZINE HYDROCHLORIDE, PSEUDOEPHEDRINE HYDROCHLORIDE 5; 120 MG/1; MG/1
1 TABLET, FILM COATED, EXTENDED RELEASE ORAL 2 TIMES DAILY
Qty: 14 TABLET | Refills: 0 | Status: SHIPPED | OUTPATIENT
Start: 2022-11-15 | End: 2022-11-22

## 2022-11-15 ASSESSMENT — ENCOUNTER SYMPTOMS
DIARRHEA: 0
SINUS PRESSURE: 0
SHORTNESS OF BREATH: 0
VOMITING: 0
COUGH: 0
SINUS PAIN: 0
TROUBLE SWALLOWING: 0
SORE THROAT: 0
RHINORRHEA: 1
EYE REDNESS: 0
EYE DISCHARGE: 0
NAUSEA: 0

## 2022-11-15 ASSESSMENT — PAIN DESCRIPTION - LOCATION: LOCATION: HEAD;FACE;THROAT

## 2022-11-15 ASSESSMENT — PAIN - FUNCTIONAL ASSESSMENT: PAIN_FUNCTIONAL_ASSESSMENT: 0-10

## 2022-11-15 ASSESSMENT — PAIN SCALES - GENERAL: PAINLEVEL_OUTOF10: 6

## 2022-11-15 ASSESSMENT — PAIN DESCRIPTION - FREQUENCY: FREQUENCY: CONTINUOUS

## 2022-11-15 NOTE — Clinical Note
Dede Cotton was seen and treated in our emergency department on 11/15/2022. He may return to work on 11/17/2022. If you have any questions or concerns, please don't hesitate to call.       STEFFANIE Mcintyre - CNP

## 2022-11-15 NOTE — ED TRIAGE NOTES
Pt presents to [facility] with c/o sinus congestion with green mucous, sore throat, vomiting started Sunday night.

## 2022-11-16 ENCOUNTER — TELEPHONE (OUTPATIENT)
Dept: FAMILY MEDICINE CLINIC | Age: 32
End: 2022-11-16

## 2022-11-16 NOTE — LETTER
[address] [facility]  [address], [address]  Phone: [phone]  Fax: [phone]    November 16, 2022    Juancho Kowalski  [address] [facility]  [address] [address]    Dear Rimma Cano,    This letter is regarding your Emergency Department (ED) visit at [address] [place] [address] on 11/15/22. Zara Rangel wanted to make sure that you understand your discharge instructions and that you were able to fill any prescriptions that may have been ordered for you. Please contact the office at the above phone number if the ED advised you to follow up with Zara Rangel, or if you have any further questions or needs. Also did you know -   *Visiting the ED for a non-emergency could result in higher co-pays than you would normally be subject to paying? *You can call your doctor even after hours so they can direct you to the most appropriate care. [facility] practices can often offer you an appointment on the same day that you call. *We have some [phone] [facility] offices that offer Walk-in appointments; check our website for availability in your community, [email or web address].      *Evisits are now available for patients for $36 through [employer] for certain conditions:  * Sinus, cold and or cough       * Diarrhea            * Headache  * Heartburn                                * Poison Rena          * Back pain     * Urinary problems                         If you do not have [employer] and are interested, please contact the office and a staff member may assist you or go to [email or web address].     Sincerely,   Eddie Shafer DO and your [facility]
Attending Attestation (For Attendings USE Only)...

## 2022-11-17 ENCOUNTER — TELEPHONE (OUTPATIENT)
Dept: FAMILY MEDICINE CLINIC | Age: 32
End: 2022-11-17

## 2022-11-17 NOTE — TELEPHONE ENCOUNTER
Heidi Barriga informed and voiced understanding and states that he was seen at a Urgent Care and diagnosed with the Flu and he will call back with any concerns that he may have.

## 2022-12-06 ENCOUNTER — HOSPITAL ENCOUNTER (EMERGENCY)
Age: 32
Discharge: HOME | End: 2022-12-06
Payer: COMMERCIAL

## 2022-12-06 VITALS
BODY MASS INDEX: 20.45 KG/M2 | WEIGHT: 155 LBS | SYSTOLIC BLOOD PRESSURE: 115 MMHG | DIASTOLIC BLOOD PRESSURE: 63 MMHG | TEMPERATURE: 98.6 F | HEART RATE: 85 BPM | OXYGEN SATURATION: 96 % | RESPIRATION RATE: 18 BRPM

## 2022-12-06 DIAGNOSIS — K21.9 GASTROESOPHAGEAL REFLUX DISEASE, UNSPECIFIED WHETHER ESOPHAGITIS PRESENT: ICD-10-CM

## 2022-12-06 DIAGNOSIS — R10.13 ABDOMINAL PAIN, EPIGASTRIC: Primary | ICD-10-CM

## 2022-12-06 PROCEDURE — 99213 OFFICE O/P EST LOW 20 MIN: CPT | Performed by: NURSE PRACTITIONER

## 2022-12-06 PROCEDURE — 99213 OFFICE O/P EST LOW 20 MIN: CPT

## 2022-12-06 ASSESSMENT — ENCOUNTER SYMPTOMS
ABDOMINAL DISTENTION: 0
CONSTIPATION: 0
SORE THROAT: 0
DIARRHEA: 0
VOMITING: 0
SHORTNESS OF BREATH: 0
ABDOMINAL PAIN: 1
NAUSEA: 1
TROUBLE SWALLOWING: 0
RHINORRHEA: 0
BLOOD IN STOOL: 0
EYE REDNESS: 0
EYE DISCHARGE: 0
COUGH: 0

## 2022-12-06 ASSESSMENT — PAIN - FUNCTIONAL ASSESSMENT: PAIN_FUNCTIONAL_ASSESSMENT: NONE - DENIES PAIN

## 2022-12-06 NOTE — ED PROVIDER NOTES
[facility] 36  Urgent Care Encounter      CHIEF COMPLAINT       Chief Complaint   Patient presents with    Abdominal Pain     Nausea           Nurses Notes reviewed and I agree except as noted in the HPI. HISTORY OF PRESENT ILLNESS   Savanna Pedroza is a 28 y.o. male who presents for evaluation of abdominal pain. Onset yesterday, change. Pain waxing/waning. History of ulcer on EGD. Patient unable to specify where ulcer was located. Patient had been on Carafate in the past.  No changes in diet. No changes in bowel habits. No dark, bloody stools. No fever. No treatment prior to arrival.  Requesting work excuse. REVIEW OF SYSTEMS     Review of Systems   Constitutional:  Positive for appetite change. Negative for chills, diaphoresis, fatigue and fever. HENT:  Negative for congestion, ear pain, rhinorrhea, sore throat and trouble swallowing. Eyes:  Negative for discharge and redness. Respiratory:  Negative for cough and shortness of breath. Cardiovascular:  Negative for chest pain. Gastrointestinal:  Positive for abdominal pain and nausea. Negative for abdominal distention, blood in stool, constipation, diarrhea and vomiting. Genitourinary:  Negative for decreased urine volume. Musculoskeletal:  Negative for neck pain and neck stiffness. Skin:  Negative for rash. Neurological:  Negative for headaches. Hematological:  Negative for adenopathy. Psychiatric/Behavioral:  Negative for sleep disturbance. PAST MEDICAL HISTORY         Diagnosis Date    COVID-19 01/10/2022       SURGICAL HISTORY     Patient  has a past surgical history that includes Tonsillectomy.     CURRENT MEDICATIONS       Discharge Medication List as of 12/6/2022  6:08 PM        CONTINUE these medications which have NOT CHANGED    Details   ibuprofen (ADVIL;MOTRIN) 800 MG tablet TAKE 1 TABLET BY MOUTH WITH FOOD EVERY 8 HOURS AS NEEDED FOR PAINHistorical Med             ALLERGIES     Patient is is allergic to pcn [penicillins]. FAMILY HISTORY     Patient'sfamily history includes Diabetes in his mother; High Blood Pressure in his mother; No Known Problems in his father. SOCIAL HISTORY     Patient  reports that he has never smoked. He has never used smokeless tobacco. He reports current drug use. Drug: Marijuana Shellye Burkitt). He reports that he does not drink alcohol. PHYSICAL EXAM     ED TRIAGE VITALS  BP: 115/63, Temp: 98.6 °F (37 °C), Heart Rate: 85, Resp: 18, SpO2: 96 %  Physical Exam  Vitals and nursing note reviewed. Constitutional:       General: He is not in acute distress. Appearance: Normal appearance. He is well-developed. He is not ill-appearing, toxic-appearing or diaphoretic. HENT:      Head: Normocephalic and atraumatic. Jaw: No trismus. Right Ear: Hearing, tympanic membrane, ear canal and external ear normal. No mastoid tenderness. No hemotympanum. Tympanic membrane is not perforated, erythematous or bulging. Left Ear: Hearing, tympanic membrane, ear canal and external ear normal. No mastoid tenderness. No hemotympanum. Tympanic membrane is not perforated, erythematous or bulging. Nose: Nose normal.      Mouth/Throat:      Mouth: Mucous membranes are moist.      Pharynx: Oropharynx is clear. Uvula midline. Tonsils: No tonsillar abscesses. 0 on the right. Eyes:      General: No scleral icterus. Conjunctiva/sclera: Conjunctivae normal.   Neck:      Thyroid: No thyromegaly. Trachea: Trachea normal.   Cardiovascular:      Rate and Rhythm: Normal rate and regular rhythm. No extrasystoles are present. Chest Wall: PMI is not displaced. Heart sounds: Normal heart sounds. No murmur heard. No friction rub. No gallop. Pulmonary:      Effort: Pulmonary effort is normal. No accessory muscle usage or respiratory distress. Breath sounds: Normal breath sounds. Abdominal:      General: Bowel sounds are normal. There is no distension. Palpations: Abdomen is soft. There is no hepatomegaly. Tenderness: There is abdominal tenderness in the epigastric area. There is no guarding or rebound. Hernia: No hernia is present. Musculoskeletal:      Cervical back: Normal range of motion and neck supple. Lymphadenopathy:      Head:      Right side of head: No submental, submandibular, tonsillar, preauricular, posterior auricular or occipital adenopathy. Left side of head: No submental, submandibular, tonsillar, preauricular, posterior auricular or occipital adenopathy. Cervical: No cervical adenopathy. Upper Body:      Right upper body: No supraclavicular adenopathy. Left upper body: No supraclavicular adenopathy. Skin:     General: Skin is warm and dry. Coloration: Skin is not pale. Findings: No rash. Comments: Skin intact, warm and dry to touch, no rashes noted on exposed surfaces. Neurological:      Mental Status: He is alert and oriented to person, place, and time. He is not disoriented. Psychiatric:         Mood and Affect: Mood normal.         Behavior: Behavior is cooperative. DIAGNOSTIC RESULTS   Labs:No results found for this visit on 12/06/22. IMAGING:  No orders to display      URGENT CARE COURSE:     Vitals:    12/06/22 1730   BP: 115/63   Pulse: 85   Resp: 18   Temp: 98.6 °F (37 °C)   TempSrc: Temporal   SpO2: 96%   Weight: 155 lb (70.3 kg)       Medications - No data to display  PROCEDURES:  None  FINAL IMPRESSION      1. Abdominal pain, epigastric    2. Gastroesophageal reflux disease, unspecified whether esophagitis present        DISPOSITION/PLAN   DISPOSITION Decision To Discharge 12/06/2022 06:07:09 PM    Nontoxic, no distress. Patient presents with epigastric abdominal pain for less than 24 hours. Pain waxing/waning. Recommended over-the-counter medication. Call GI tomorrow. If symptoms worsen go to ER. PATIENT REFERRED TO:  Maryam Levy DO  [address] [place] [address] [address]  [phone]      Follow-up with PCP as needed. Call GI tomorrow. Mylanta/Maalox over-the-counter. If symptoms worsen go to ER.     DISCHARGE MEDICATIONS:  Discharge Medication List as of 12/6/2022  6:08 PM        Discharge Medication List as of 12/6/2022  6:08 PM          [address] [facility], APRN - CNP  12/06/22 1796

## 2022-12-06 NOTE — ED TRIAGE NOTES
Patient to room with c/o upper abdominal pain, nausea, and loose stool beginning yesterday. States history of ulcers. States, \"I feel like I did when I had that ulcer. \" Requests work excuse.

## 2022-12-06 NOTE — Clinical Note
Natalie Solano was seen and treated in our emergency department on 12/6/2022. He may return to work on 12/07/2022. If you have any questions or concerns, please don't hesitate to call.       Tani Ferraro, STEFFANIE - CNP

## 2022-12-07 ENCOUNTER — TELEPHONE (OUTPATIENT)
Dept: FAMILY MEDICINE CLINIC | Age: 32
End: 2022-12-07

## 2023-01-10 ENCOUNTER — OFFICE VISIT (OUTPATIENT)
Dept: FAMILY MEDICINE CLINIC | Age: 33
End: 2023-01-10
Payer: COMMERCIAL

## 2023-01-10 VITALS
SYSTOLIC BLOOD PRESSURE: 108 MMHG | OXYGEN SATURATION: 98 % | HEART RATE: 68 BPM | BODY MASS INDEX: 20.41 KG/M2 | WEIGHT: 154 LBS | DIASTOLIC BLOOD PRESSURE: 68 MMHG | RESPIRATION RATE: 14 BRPM | HEIGHT: 73 IN | TEMPERATURE: 97.9 F

## 2023-01-10 DIAGNOSIS — R11.0 NAUSEA: ICD-10-CM

## 2023-01-10 DIAGNOSIS — Z01.20 NEED FOR ASSESSMENT BY DENTISTRY FOR POOR DENTITION: ICD-10-CM

## 2023-01-10 DIAGNOSIS — K04.7 DENTAL ABSCESS: Primary | ICD-10-CM

## 2023-01-10 DIAGNOSIS — G44.209 TENSION HEADACHE: ICD-10-CM

## 2023-01-10 PROCEDURE — 99213 OFFICE O/P EST LOW 20 MIN: CPT | Performed by: STUDENT IN AN ORGANIZED HEALTH CARE EDUCATION/TRAINING PROGRAM

## 2023-01-10 PROCEDURE — G8420 CALC BMI NORM PARAMETERS: HCPCS | Performed by: STUDENT IN AN ORGANIZED HEALTH CARE EDUCATION/TRAINING PROGRAM

## 2023-01-10 PROCEDURE — G8427 DOCREV CUR MEDS BY ELIG CLIN: HCPCS | Performed by: STUDENT IN AN ORGANIZED HEALTH CARE EDUCATION/TRAINING PROGRAM

## 2023-01-10 PROCEDURE — G8484 FLU IMMUNIZE NO ADMIN: HCPCS | Performed by: STUDENT IN AN ORGANIZED HEALTH CARE EDUCATION/TRAINING PROGRAM

## 2023-01-10 PROCEDURE — 1036F TOBACCO NON-USER: CPT | Performed by: STUDENT IN AN ORGANIZED HEALTH CARE EDUCATION/TRAINING PROGRAM

## 2023-01-10 RX ORDER — NAPROXEN 500 MG/1
500 TABLET ORAL 2 TIMES DAILY WITH MEALS
Qty: 60 TABLET | Refills: 0 | Status: SHIPPED | OUTPATIENT
Start: 2023-01-10

## 2023-01-10 RX ORDER — ONDANSETRON 4 MG/1
4 TABLET, FILM COATED ORAL 3 TIMES DAILY PRN
Qty: 30 TABLET | Refills: 0 | Status: SHIPPED | OUTPATIENT
Start: 2023-01-10

## 2023-01-10 RX ORDER — CHLORHEXIDINE GLUCONATE 0.12 MG/ML
15 RINSE ORAL 2 TIMES DAILY
Qty: 420 ML | Refills: 0 | Status: SHIPPED | OUTPATIENT
Start: 2023-01-10 | End: 2023-01-24

## 2023-01-10 RX ORDER — CLINDAMYCIN HYDROCHLORIDE 150 MG/1
150 CAPSULE ORAL 3 TIMES DAILY
COMMUNITY

## 2023-01-10 ASSESSMENT — ENCOUNTER SYMPTOMS
DIARRHEA: 1
BLOOD IN STOOL: 0
NAUSEA: 1
VOMITING: 0

## 2023-01-10 NOTE — PROGRESS NOTES
Health Maintenance Due   Topic Date Due    COVID-19 Vaccine (1) Never done    Varicella vaccine (1 of 2 - 2-dose childhood series) Never done    DTaP/Tdap/Td vaccine (1 - Tdap) Never done    Flu vaccine (1) Never done

## 2023-01-10 NOTE — PROGRESS NOTES
S: 28 y.o. male with   Chief Complaint   Patient presents with    Headache     Nausea constant and waves of headaches    Dental Pain     Tooth pain - waiting for oral surgery  Not able to eat or sleep properly due to his pain       HPI: please see resident note for HPI and ROS. 26-year-old male presents the office with concern of dental pain. Patient has poor dental care and history of multiple dental abscesses and caries. Missed his dental appointment and is rescheduled for next month. He is asking for medication to help with nausea and pain today. BP Readings from Last 3 Encounters:   01/10/23 108/68   12/06/22 115/63   11/15/22 110/71     Wt Readings from Last 3 Encounters:   01/10/23 154 lb (69.9 kg)   12/06/22 155 lb (70.3 kg)   11/15/22 152 lb (68.9 kg)       O: VS:  height is 6' 1\" (1.854 m) and weight is 154 lb (69.9 kg). His oral temperature is 97.9 °F (36.6 °C). His blood pressure is 108/68 and his pulse is 68. His respiration is 14 and oxygen saturation is 98%. Diagnosis Orders   1. Dental abscess  chlorhexidine (PERIDEX) 0.12 % solution    naproxen (NAPROSYN) 500 MG tablet      2. Need for assessment by dentistry for poor dentition        3. Nausea  ondansetron (ZOFRAN) 4 MG tablet      4. Tension headache  naproxen (NAPROSYN) 500 MG tablet          Plan:  Please refer to resident note for full plan. Dental pain, poor dentition: Chronic, uncontrolled. Patient advised to  clindamycin as prescribed by dentist office. Resident physician is planning to start patient on chlorhexidine and naproxen for pain control. Can trial Zofran as needed for nausea. Advised to follow-up with dentist as scheduled.     Health Maintenance Due   Topic Date Due    COVID-19 Vaccine (1) Never done    Varicella vaccine (1 of 2 - 2-dose childhood series) Never done    DTaP/Tdap/Td vaccine (1 - Tdap) Never done    Flu vaccine (1) Never done       Attending Physician Statement  I have discussed the case, including pertinent history and exam findings with the resident. I agree with the documented assessment and plan as documented by the resident.         Marco Libman, DO 1/11/2023 9:03 AM

## 2023-01-10 NOTE — PROGRESS NOTES
[address] [facility] [address] [address]  Dept: [phone]  Loc: [phone]    Patient presents for headache, dental pain. Please see Resident note for complete HPI. ROS per Resident    Lab Results   Component Value Date    WBC 6.7 10/25/2022    HGB 14.1 10/25/2022    HCT 42.2 10/25/2022    MCV 88.1 10/25/2022     10/25/2022     Lab Results   Component Value Date     10/25/2022    K 4.0 10/25/2022     10/25/2022    CO2 28 10/25/2022    BUN 9 10/25/2022    CREATININE 0.9 10/25/2022    GLUCOSE 87 10/25/2022    CALCIUM 8.8 10/25/2022    PROT 7.0 10/25/2022    LABALBU 4.5 10/25/2022    BILITOT 0.3 10/25/2022    ALKPHOS 46 10/25/2022    AST 16 10/25/2022    ALT 13 10/25/2022    LABGLOM >60 10/25/2022    GFRAA >60 02/17/2020     Lab Results   Component Value Date    LABA1C 5.1 10/25/2022     No results found for: EAG  No results found for: LABMICR, [id number]  Lab Results   Component Value Date    TSH 1.38 02/17/2020     Lab Results   Component Value Date    CHOL 149 10/25/2022     Lab Results   Component Value Date    TRIG 84 10/25/2022     Lab Results   Component Value Date    HDL 54 10/25/2022     Lab Results   Component Value Date    LDLCALC 78 10/25/2022     No results found for: LABVLDL, VLDL  No results found for: CHOLHDLRATIO  No results found for: PSA, PSADIA    Health Maintenance Due   Topic Date Due    COVID-19 Vaccine (1) Never done    Varicella vaccine (1 of 2 - 2-dose childhood series) Never done    DTaP/Tdap/Td vaccine (1 - Tdap) Never done    Flu vaccine (1) Never done         Physical Exam per Resident       ICD-10-CM    1. Dental abscess  K04.7       2. Need for assessment by dentistry for poor dentition  Z01.20       3. Nausea  R11.0               Plan  I participated in the discussion and care of this patient   Dentist sent in new script for Cleocin. Full plan per primary resident.

## 2023-01-10 NOTE — LETTER
[facility] [address],[address] [facility] [address] [place], [place] [address]  Phone: [phone]  Fax: [address] [place] [address] Drive,         January 10, 2023     Patient: Elizabeth Arreola   YOB: 1990   Date of Visit: 1/10/2023       To Whom it May Concern:    Elizabeth Arreola was seen in my clinic on 1/10/2023. He may return to work on 1/11/23. If you have any questions or concerns, please don't hesitate to call.     Sincerely,         Emile Lynch DO

## 2023-01-10 NOTE — PROGRESS NOTES
[address] [facility] [address] [address]  Dept: [phone]  Dept Fax: [phone]  Loc: Julianna Mary is a 28 y.o. male who presents today for:  Chief Complaint   Patient presents with    Headache     Nausea constant and waves of headaches    Dental Pain     Tooth pain - waiting for oral surgery  Not able to eat or sleep properly due to his pain         HPI:   Mary Jo Sharma is 28 y.o. who presents today for dental pain and headaches. Overslept for his last dental appointment in September, they rescheduled it for February 2nd. Called dentist today and they prescribed clindamycin again. Feels that is in so much pain that it is causing nausea, unable to eat, food getting lodged into to the teeth, having more \"liquid stools\" because of the pain. Has also been experiencing headaches for the last couple weeks that last for 45 minutes until takes an ibuprofen. Describes headaches as a dull aching, pain, does not feel lke a migraine. Pain never over a 7, just persistently annoying. Still has some ibuprofen that dentist prescribed but has not been taking because of size. Ibuprofen helps with headaches but not the mouth pain.        Objective:     Vitals:    01/10/23 1519   BP: 108/68   Pulse: 68   Resp: 14   Temp: 97.9 °F (36.6 °C)   SpO2: 98%         Wt Readings from Last 3 Encounters:   01/10/23 154 lb (69.9 kg)   12/06/22 155 lb (70.3 kg)   11/15/22 152 lb (68.9 kg)       BP Readings from Last 3 Encounters:   01/10/23 108/68   12/06/22 115/63   11/15/22 110/71       Lab Results   Component Value Date    WBC 6.7 10/25/2022    HGB 14.1 10/25/2022    HCT 42.2 10/25/2022    MCV 88.1 10/25/2022     10/25/2022     Lab Results   Component Value Date     10/25/2022    K 4.0 10/25/2022     10/25/2022    CO2 28 10/25/2022    BUN 9 10/25/2022    CREATININE 0.9 10/25/2022    GLUCOSE 87 10/25/2022 CALCIUM 8.8 10/25/2022    PROT 7.0 10/25/2022    LABALBU 4.5 10/25/2022    BILITOT 0.3 10/25/2022    ALKPHOS 46 10/25/2022    AST 16 10/25/2022    ALT 13 10/25/2022    LABGLOM >60 10/25/2022    GFRAA >60 02/17/2020     Lab Results   Component Value Date    TSH 1.38 02/17/2020     Lab Results   Component Value Date    LABA1C 5.1 10/25/2022     No results found for: EAG  Lab Results   Component Value Date    CHOL 149 10/25/2022     Lab Results   Component Value Date    TRIG 84 10/25/2022     Lab Results   Component Value Date    HDL 54 10/25/2022     Lab Results   Component Value Date    LDLCALC 78 10/25/2022     No results found for: PSA, PSADIA  Lab Results   Component Value Date    QVIUGVFG03 866 10/25/2022     Lab Results   Component Value Date/Time    VITD25 19 10/25/2022 10:15 AM          No results found for: LABMICR, [id number]    Review of Systems   Constitutional:  Negative for chills and fever. HENT:  Positive for dental problem. Cardiovascular:  Negative for chest pain and palpitations. Gastrointestinal:  Positive for diarrhea and nausea. Negative for blood in stool and vomiting. Genitourinary:  Negative for hematuria. Neurological:  Positive for headaches. Negative for dizziness and light-headedness. Physical Exam  Constitutional:       Appearance: Normal appearance. HENT:      Right Ear: External ear normal.      Left Ear: External ear normal.      Mouth/Throat:      Mouth: Mucous membranes are moist.      Dentition: Dental caries and dental abscesses present. Eyes:      Extraocular Movements: Extraocular movements intact. Conjunctiva/sclera: Conjunctivae normal.      Pupils: Pupils are equal, round, and reactive to light. Cardiovascular:      Rate and Rhythm: Normal rate and regular rhythm. Heart sounds: Normal heart sounds. No murmur heard. Pulmonary:      Effort: Pulmonary effort is normal. No respiratory distress. Breath sounds: Normal breath sounds. No wheezing. Abdominal:      General: Abdomen is flat. There is no distension. Palpations: Abdomen is soft. Tenderness: There is no abdominal tenderness. Skin:     General: Skin is warm. Neurological:      General: No focal deficit present. Mental Status: He is alert and oriented to person, place, and time. There is no immunization history on file for this patient. Health Maintenance Due   Topic Date Due    COVID-19 Vaccine (1) Never done    Varicella vaccine (1 of 2 - 2-dose childhood series) Never done    DTaP/Tdap/Td vaccine (1 - Tdap) Never done    Flu vaccine (1) Never done       Food Insecurity: Food Insecurity Present    Worried About Running Out of Food in the Last Year: Sometimes true    Ran Out of Food in the Last Year: Sometimes true       Assessment / Plan:   1. Dental abscess  - Emphasized to patient importance of making next dental appointment, issues will not resolve until addressed by dentist  - Will begin naproxen for pain control, as dental pain not controlled by ibuprofen   - chlorhexidine (PERIDEX) 0.12 % solution; Take 15 mLs by mouth 2 times daily for 14 days  Dispense: 420 mL; Refill: 0  - naproxen (NAPROSYN) 500 MG tablet; Take 1 tablet by mouth 2 times daily (with meals)  Dispense: 60 tablet; Refill: 0    2. Need for assessment by dentistry for poor dentition    3. Nausea  - ondansetron (ZOFRAN) 4 MG tablet; Take 1 tablet by mouth 3 times daily as needed for Nausea or Vomiting  Dispense: 30 tablet; Refill: 0    4. Tension headache  - Naproxen in place of ibuprofen for suspected tension headache  - naproxen (NAPROSYN) 500 MG tablet; Take 1 tablet by mouth 2 times daily (with meals)  Dispense: 60 tablet; Refill: 0      Letter given for patient that was seen in office today and okay to return to work tomorrow on 1/11/23. Return if symptoms worsen or fail to improve. No future appointments. Patient given educational materials - see patient instructions. Discussed use, benefit, and sideeffects of prescribed medications. All patient questions answered. Pt voiced understanding. Reviewed health maintenance. Instructed to continue current medications, diet and exercise. Patient agreed with treatment plan. Follow up as directed.      Electronically signed by Kamini Beckett DO on 1/10/2023 at 9:37 PM

## 2023-01-19 ENCOUNTER — HOSPITAL ENCOUNTER (EMERGENCY)
Age: 33
Discharge: HOME | End: 2023-01-19
Payer: COMMERCIAL

## 2023-01-19 VITALS
OXYGEN SATURATION: 100 % | TEMPERATURE: 97.5 F | HEART RATE: 75 BPM | DIASTOLIC BLOOD PRESSURE: 70 MMHG | SYSTOLIC BLOOD PRESSURE: 113 MMHG | RESPIRATION RATE: 20 BRPM

## 2023-01-19 DIAGNOSIS — J06.9 ACUTE UPPER RESPIRATORY INFECTION: Primary | ICD-10-CM

## 2023-01-19 LAB
FLUAV RNA RESP QL NAA+PROBE: NOT DETECTED
FLUBV RNA RESP QL NAA+PROBE: NOT DETECTED
S PYO AG THROAT QL: NEGATIVE
SARS-COV-2 RNA RESP QL NAA+PROBE: NOT DETECTED

## 2023-01-19 PROCEDURE — 99213 OFFICE O/P EST LOW 20 MIN: CPT

## 2023-01-19 PROCEDURE — 99213 OFFICE O/P EST LOW 20 MIN: CPT | Performed by: NURSE PRACTITIONER

## 2023-01-19 PROCEDURE — 87651 STREP A DNA AMP PROBE: CPT

## 2023-01-19 PROCEDURE — 87636 SARSCOV2 & INF A&B AMP PRB: CPT

## 2023-01-19 RX ORDER — LORATADINE, PSEUDOEPHEDRINE SULFATE 5; 120 MG/1; MG/1
1 TABLET, FILM COATED, EXTENDED RELEASE ORAL 2 TIMES DAILY
Qty: 30 TABLET | Refills: 0 | Status: SHIPPED | OUTPATIENT
Start: 2023-01-19

## 2023-01-19 ASSESSMENT — ENCOUNTER SYMPTOMS
COUGH: 1
EYES NEGATIVE: 1
SORE THROAT: 1

## 2023-01-19 NOTE — DISCHARGE INSTRUCTIONS
Your strep test was negative. Your influenza and COVID test is pending. Take medications as prescribed. Alternate acetaminophen and ibuprofen as needed for fever, chills, body aches. Drink plenty of fluids. Report to ER with new or severe symptoms.

## 2023-01-19 NOTE — ED PROVIDER NOTES
[address] [facility] Encounter      [address] [facility]       Chief Complaint   Patient presents with    Pharyngitis    Headache       Nurses Notes reviewed and I agree except as noted in the HPI. HISTORY OF PRESENT ILLNESS   Mendez Mayers is a 28 y.o. male who presents urgent care today stating that he woke up approximately 3 hours ago with body aches, headache, sore throat, cough. He denies fevers. He does complain of some chills. REVIEW OF SYSTEMS     Review of Systems   Constitutional:  Positive for chills and fatigue. Negative for fever. HENT:  Positive for congestion and sore throat. Eyes: Negative. Respiratory:  Positive for cough. Cardiovascular:  Negative for chest pain. Musculoskeletal:  Positive for myalgias. PAST MEDICAL HISTORY         Diagnosis Date    COVID-19 01/10/2022       SURGICAL HISTORY     Patient  has a past surgical history that includes Tonsillectomy. CURRENT MEDICATIONS       Discharge Medication List as of 1/19/2023  3:27 PM        CONTINUE these medications which have NOT CHANGED    Details   clindamycin (CLEOCIN) 150 MG capsule Take 150 mg by mouth 3 times dailyHistorical Med      chlorhexidine (PERIDEX) 0.12 % solution Take 15 mLs by mouth 2 times daily for 14 days, Disp-420 mL, R-0Normal      ondansetron (ZOFRAN) 4 MG tablet Take 1 tablet by mouth 3 times daily as needed for Nausea or Vomiting, Disp-30 tablet, R-0Normal      naproxen (NAPROSYN) 500 MG tablet Take 1 tablet by mouth 2 times daily (with meals), Disp-60 tablet, R-0Normal      ibuprofen (ADVIL;MOTRIN) 800 MG tablet Historical Med             ALLERGIES     Patient is is allergic to pcn [penicillins]. FAMILY HISTORY     Patient'sfamily history includes Diabetes in his mother; High Blood Pressure in his mother; No Known Problems in his father. SOCIAL HISTORY     Patient  reports that he has never smoked. He has never used smokeless tobacco. He reports current drug use. Drug: Marijuana Yasmin Dasen). He reports that he does not drink alcohol. PHYSICAL EXAM     ED TRIAGE VITALS  BP: 113/70, Temp: 97.5 °F (36.4 °C), Heart Rate: 75, Resp: 20, SpO2: 100 %  Physical Exam  Constitutional:       General: He is not in acute distress. Appearance: He is well-developed. He is not ill-appearing or toxic-appearing. HENT:      Head: Normocephalic and atraumatic. Right Ear: Tympanic membrane normal.      Left Ear: Tympanic membrane normal.      Nose: No congestion or rhinorrhea. Mouth/Throat:      Mouth: Mucous membranes are moist.      Pharynx: Oropharynx is clear. No pharyngeal swelling, posterior oropharyngeal erythema or uvula swelling. Tonsils: 0 on the right. 0 on the left. Eyes:      Conjunctiva/sclera: Conjunctivae normal.      Pupils: Pupils are equal, round, and reactive to light. Cardiovascular:      Rate and Rhythm: Normal rate and regular rhythm. Heart sounds: No murmur heard. Pulmonary:      Effort: Pulmonary effort is normal.      Breath sounds: Normal breath sounds. No wheezing. Abdominal:      General: Bowel sounds are normal.      Palpations: Abdomen is soft. Tenderness: There is no abdominal tenderness. Musculoskeletal:      Cervical back: Normal range of motion and neck supple. Lymphadenopathy:      Cervical: No cervical adenopathy. Skin:     General: Skin is warm and dry. Capillary Refill: Capillary refill takes less than 2 seconds. Neurological:      General: No focal deficit present. Mental Status: He is alert and oriented to person, place, and time.    Psychiatric:         Mood and Affect: Mood normal.         Behavior: Behavior normal.       DIAGNOSTIC RESULTS   Labs:  Results for orders placed or performed during the hospital encounter of 01/19/23   Strep Screen Group A Throat   Result Value Ref Range    Rapid Strep A Screen NEGATIVE        IMAGING:  No orders to display     URGENT CARE COURSE:         Medications - No data to display  PROCEDURES:  FINALIMPRESSION      1. Acute upper respiratory infection        DISPOSITION/PLAN   DISPOSITION Decision To Discharge 01/19/2023 03:26:33 PM    Patient is non-ill-appearing. Afebrile. Strep is negative. COVID and influenza PCR not resulted at this time. Advised that if there are any positive results they will show up in his MyChart. Provide work note for today. Will prescribe Claritin-D for congestion. He denies questions. PATIENT REFERRED TO:  Kalpesh Jo DO  [address] [place] [address]  [phone]        DISCHARGE MEDICATIONS:  Discharge Medication List as of 1/19/2023  3:27 PM        START taking these medications    Details   loratadine-pseudoephedrine (LORATADINE-D 12HR) 5-120 MG per extended release tablet Take 1 tablet by mouth 2 times daily, Disp-30 tablet, R-0Normal           Discharge Medication List as of 1/19/2023  3:27 PM          STEFFANIE Gan CNP, APRN - CNP  01/19/23 8193

## 2023-01-19 NOTE — ED TRIAGE NOTES
Pt to UC with c/o scratchy throat, sinus pressure and headache that started a few hours ago.  Pt would like covid tested

## 2023-01-19 NOTE — Clinical Note
Latasha Edgard was seen and treated in our emergency department on 1/19/2023. He may return to work on 01/20/2023. If you have any questions or concerns, please don't hesitate to call.       Adrien Martinez, APRN - CNP

## 2023-01-20 ENCOUNTER — TELEPHONE (OUTPATIENT)
Dept: FAMILY MEDICINE CLINIC | Age: 33
End: 2023-01-20

## 2023-01-20 NOTE — LETTER
[address] [place] [address],[address] [facility] [address]  [facility] [address]  Phone: [phone]  Fax: Leon Quinn 27, DO        January 20, 2023    Eddy Elizabeth  [address] [id number]      Dear Aris Adkins:    [address] [facility]  [address], [address]  Phone: [phone]  Fax: [phone]    January 20, 2023    Eddy Elizabeth Church      Dear Aris Adkins,    This letter is regarding your Emergency Department (ED) visit at [address] [place] [address] on 1/19/23. Dr. Albrecht wanted to make sure that you understand your discharge instructions and that you were able to fill any prescriptions that may have been ordered for you. Please contact the office at the above phone number if the ED advised you to follow up with Dr. Albrecht, or if you have any further questions or needs. Also did you know -   *Visiting the ED for a non-emergency could result in higher co-pays than you would normally be subject to paying? *You can call your doctor even after hours so they can direct you to the most appropriate care. [facility]) practices can often offer you an appointment on the same day that you call. *We have some [facility] offices that offer Walk-in appointments; check our website for availability in your community, www. [email or web address]      *Evisits are now available for patients for $36 through [employer] for certain conditions:  * Sinus, cold and or cough       * Diarrhea            * Headache  * Heartburn                                * Poison Rena          * Back pain     * Urinary problems                         If you do not have [employer] and are interested, please contact the office and a staff member may assist you or go to [email or web address].     Sincerely,     DO Trena and your [facility] Team         If you have any questions or concerns, please don't hesitate to call.     Sincerely,        Ender Steven, DO

## 2023-02-07 ENCOUNTER — HOSPITAL ENCOUNTER (EMERGENCY)
Age: 33
Discharge: HOME | End: 2023-02-07
Payer: COMMERCIAL

## 2023-02-07 VITALS
BODY MASS INDEX: 20.45 KG/M2 | HEART RATE: 98 BPM | WEIGHT: 155 LBS | DIASTOLIC BLOOD PRESSURE: 84 MMHG | RESPIRATION RATE: 16 BRPM | SYSTOLIC BLOOD PRESSURE: 139 MMHG | TEMPERATURE: 97.9 F | OXYGEN SATURATION: 97 %

## 2023-02-07 DIAGNOSIS — J01.00 ACUTE NON-RECURRENT MAXILLARY SINUSITIS: ICD-10-CM

## 2023-02-07 DIAGNOSIS — H66.001 NON-RECURRENT ACUTE SUPPURATIVE OTITIS MEDIA OF RIGHT EAR WITHOUT SPONTANEOUS RUPTURE OF TYMPANIC MEMBRANE: Primary | ICD-10-CM

## 2023-02-07 PROCEDURE — 99213 OFFICE O/P EST LOW 20 MIN: CPT

## 2023-02-07 PROCEDURE — 99213 OFFICE O/P EST LOW 20 MIN: CPT | Performed by: NURSE PRACTITIONER

## 2023-02-07 RX ORDER — LORATADINE, PSEUDOEPHEDRINE SULFATE 5; 120 MG/1; MG/1
1 TABLET, FILM COATED, EXTENDED RELEASE ORAL 2 TIMES DAILY PRN
Qty: 30 TABLET | Refills: 0 | Status: SHIPPED | OUTPATIENT
Start: 2023-02-07

## 2023-02-07 RX ORDER — AZITHROMYCIN 250 MG/1
250 TABLET, FILM COATED ORAL SEE ADMIN INSTRUCTIONS
Qty: 6 TABLET | Refills: 0 | Status: SHIPPED | OUTPATIENT
Start: 2023-02-07 | End: 2023-02-12

## 2023-02-07 ASSESSMENT — ENCOUNTER SYMPTOMS
NAUSEA: 0
SINUS PAIN: 1
WHEEZING: 0
BLOOD IN STOOL: 0
EYE PAIN: 0
CONSTIPATION: 0
ABDOMINAL PAIN: 0
RHINORRHEA: 0
VOMITING: 0
COUGH: 1
SINUS PRESSURE: 1
DIARRHEA: 0
SHORTNESS OF BREATH: 0

## 2023-02-07 ASSESSMENT — PAIN - FUNCTIONAL ASSESSMENT: PAIN_FUNCTIONAL_ASSESSMENT: 0-10

## 2023-02-07 ASSESSMENT — PAIN SCALES - GENERAL: PAINLEVEL_OUTOF10: 3

## 2023-02-07 NOTE — Clinical Note
Sruthi Manuel was seen and treated in our emergency department on 2/7/2023. He may return to work on 02/09/2023. If you have any questions or concerns, please don't hesitate to call.       Yaneth Sharif, APRN - CNP

## 2023-02-07 NOTE — ED TRIAGE NOTES
States has been treating at home with otc and vitamins with no help for the last week, denies fevers

## 2023-02-07 NOTE — DISCHARGE INSTRUCTIONS
Go to ER for worsening symptoms, visual changes, inability to keep liquids down, inability to urinate for greater than 8 hours or difficulty breathing. Follow-up with your primary care provider. Increase fluid intake. Keep ears dry.

## 2023-02-07 NOTE — ED PROVIDER NOTES
[address]       Chief Complaint   Patient presents with    URI     Cough and ear pain    Ear Fullness     Loss of hearing right ear        Nurses Notes reviewed and I agree except as noted in the HPI. HISTORY OF PRESENT ILLNESS   Izaiah Nguyen is a 28 y.o. male who presents to urgent care with complaint of cough, nasal congestion, sinus pressure and right ear fullness that started 7 days ago. Patient states he has been taking over-the-counter vitamins and drinking orange juice for his symptoms. Of note, patient was seen 1/19/2023 for similar symptoms. It is unclear if the symptoms completely went away, but patient insists that they just started 7 days ago. Patient denies other symptoms including chest pain, shortness of breath, nausea, vomiting, diarrhea or fever. REVIEW OF SYSTEMS     Review of Systems   Constitutional:  Negative for appetite change, chills, fatigue, fever and unexpected weight change. HENT:  Positive for congestion, ear pain, sinus pressure and sinus pain. Negative for rhinorrhea. Eyes:  Negative for pain and visual disturbance. Respiratory:  Positive for cough. Negative for shortness of breath and wheezing. Cardiovascular:  Negative for chest pain, palpitations and leg swelling. Gastrointestinal:  Negative for abdominal pain, blood in stool, constipation, diarrhea, nausea and vomiting. Genitourinary:  Negative for dysuria, frequency and hematuria. Musculoskeletal:  Negative for arthralgias and joint swelling. Skin:  Negative for rash. Neurological:  Negative for dizziness, syncope, weakness, light-headedness and headaches. Hematological:  Does not bruise/bleed easily. PAST MEDICAL HISTORY         Diagnosis Date    COVID-19 01/10/2022       SURGICAL HISTORY     Patient  has a past surgical history that includes Tonsillectomy.     CURRENT MEDICATIONS       Previous Medications    CLINDAMYCIN (CLEOCIN) 150 MG CAPSULE    Take 150 mg by mouth 3 times daily    IBUPROFEN (ADVIL;MOTRIN) 800 MG TABLET        NAPROXEN (NAPROSYN) 500 MG TABLET    Take 1 tablet by mouth 2 times daily (with meals)    ONDANSETRON (ZOFRAN) 4 MG TABLET    Take 1 tablet by mouth 3 times daily as needed for Nausea or Vomiting       ALLERGIES     Patient is is allergic to pcn [penicillins]. FAMILY HISTORY     Patient'sfamily history includes Diabetes in his mother; High Blood Pressure in his mother; No Known Problems in his father. SOCIAL HISTORY     Patient  reports that he has never smoked. He has never used smokeless tobacco. He reports current drug use. Drug: Marijuana Ruffus Endo). He reports that he does not drink alcohol. PHYSICAL EXAM     ED TRIAGE VITALS  BP: 139/84, Temp: 97.9 °F (36.6 °C), Heart Rate: 98, Resp: 16, SpO2: 97 %  Physical Exam  Vitals and nursing note reviewed. Constitutional:       General: He is not in acute distress. Appearance: He is well-developed. He is not ill-appearing, toxic-appearing or diaphoretic. HENT:      Head: Normocephalic and atraumatic. Right Ear: Tympanic membrane is bulging. Left Ear: Tympanic membrane, ear canal and external ear normal.   Eyes:      Conjunctiva/sclera: Conjunctivae normal.   Cardiovascular:      Rate and Rhythm: Normal rate and regular rhythm. Heart sounds: Normal heart sounds. No murmur heard. No gallop. Pulmonary:      Effort: Pulmonary effort is normal. No respiratory distress. Breath sounds: Normal breath sounds. No decreased breath sounds, wheezing, rhonchi or rales. Abdominal:      Palpations: Abdomen is soft. Musculoskeletal:         General: Normal range of motion. Cervical back: Normal range of motion and neck supple. Skin:     General: Skin is warm and dry. Neurological:      Mental Status: He is alert and oriented to person, place, and time.        DIAGNOSTIC RESULTS   Labs:No results found for this visit on 02/07/23. IMAGING:  No orders to display     URGENT CARE COURSE:        MDM      Patient presents to urgent care with complaint of cough, nasal congestion, sinus pressure and right ear fullness that started 7 days ago. Patient appears to have an otitis media on the right. Tympanic membrane is bulging. Patient also complains of green nasal drainage that is been ongoing for 7 days. We will treat with oral azithromycin as patient is allergic to PENICILLINS and is unsure if he can take cephalosporins. Patient to follow-up with his primary care provider. Patient instructed to Go to ER for worsening symptoms, visual changes, inability to keep liquids down, inability to urinate for greater than 8 hours or difficulty breathing. Follow-up with your primary care provider. Increase fluid intake. Keep ears dry. Medications - No data to display  PROCEDURES:    Procedures    FINALIMPRESSION      1. Non-recurrent acute suppurative otitis media of right ear without spontaneous rupture of tympanic membrane    2. Acute non-recurrent maxillary sinusitis        DISPOSITION/PLAN   DISPOSITION Decision To Discharge 02/07/2023 12:20:47 PM    PATIENT REFERRED TO:  Rosemary Basilio DO  [address]  [phone]    In 2 days    DISCHARGE MEDICATIONS:  New Prescriptions    AZITHROMYCIN (ZITHROMAX) 250 MG TABLET    Take 1 tablet by mouth See Admin Instructions for 5 days 500mg on day 1 followed by 250mg on days 2 - 5     Current Discharge Medication List        CONTINUE these medications which have CHANGED    Details   loratadine-pseudoephedrine (LORATADINE-D 12HR) 5-120 MG per extended release tablet Take 1 tablet by mouth 2 times daily as needed (congestion)  Qty: 30 tablet, Refills: 0             STEFFANIE Grullon CNP, APRN - CNP  02/07/23 2501

## 2024-12-16 NOTE — ED PROVIDER NOTES
[facility]  Urgent Care Encounter      CHIEF COMPLAINT       Chief Complaint   Patient presents with    Nasal Congestion       Nurses Notes reviewed and I agree except as noted in the HPI. HISTORY OF PRESENT ILLNESS   Angelito Dutton is a 28 y.o. male who presents for evaluation of sinus congestion. Onset of symptoms Monday, unchanged. Patient complains of sinus congestion without sinus pain or pressure. Associated sinus headache, rates 6/10. No worsening of life. No travel. Patient exposed to similar symptoms. No exposure to strep, COVID, flu. Patient was recently placed on prednisone for allergic rhinitis. Patient had taken 1 day of medication and felt better. He notes stopping medication after feeling better. No current treatment. No additional complaints. REVIEW OF SYSTEMS     Review of Systems   Constitutional:  Negative for chills, diaphoresis, fatigue and fever. HENT:  Positive for congestion, postnasal drip and rhinorrhea. Negative for ear pain, sinus pressure, sinus pain, sore throat and trouble swallowing. Eyes:  Negative for discharge and redness. Respiratory:  Negative for cough and shortness of breath. Cardiovascular:  Negative for chest pain. Gastrointestinal:  Negative for diarrhea, nausea and vomiting. Genitourinary:  Negative for decreased urine volume. Musculoskeletal:  Negative for neck pain and neck stiffness. Skin:  Negative for rash. Neurological:  Positive for headaches. Negative for dizziness. Hematological:  Negative for adenopathy. Psychiatric/Behavioral:  Negative for sleep disturbance. PAST MEDICAL HISTORY         Diagnosis Date    COVID-19 01/10/2022       SURGICAL HISTORY     Patient  has a past surgical history that includes Tonsillectomy.     CURRENT MEDICATIONS       Discharge Medication List as of 11/15/2022 10:38 AM        CONTINUE these medications which have NOT CHANGED    Details   ibuprofen (ADVIL;MOTRIN) 800 MG tablet TAKE 1 TABLET BY MOUTH WITH FOOD EVERY 8 HOURS AS NEEDED FOR PAINHistorical Med             ALLERGIES     Patient is is allergic to pcn [penicillins]. FAMILY HISTORY     Patient'sfamily history includes Diabetes in his mother; High Blood Pressure in his mother; No Known Problems in his father. SOCIAL HISTORY     Patient  reports that he has never smoked. He has never used smokeless tobacco. He reports current drug use. Drug: Marijuana Milagros Legions). He reports that he does not drink alcohol. PHYSICAL EXAM     ED TRIAGE VITALS  BP: 110/71, Temp: 97.8 °F (36.6 °C), Heart Rate: 79, Resp: 16, SpO2: 99 %  Physical Exam  Vitals and nursing note reviewed. Constitutional:       General: He is not in acute distress. Appearance: Normal appearance. He is well-developed. He is not ill-appearing, toxic-appearing or diaphoretic. HENT:      Head: Normocephalic and atraumatic. Jaw: No trismus. Right Ear: Hearing, tympanic membrane, ear canal and external ear normal. No mastoid tenderness. No hemotympanum. Tympanic membrane is not perforated, erythematous or bulging. Left Ear: Hearing, tympanic membrane, ear canal and external ear normal. No mastoid tenderness. No hemotympanum. Tympanic membrane is not perforated, erythematous or bulging. Nose: Congestion present. No rhinorrhea. Mouth/Throat:      Mouth: Mucous membranes are moist.      Pharynx: Oropharynx is clear. Uvula midline. Tonsils: 0 on the right. 0 on the left. Eyes:      General: No scleral icterus. Conjunctiva/sclera: Conjunctivae normal.   Neck:      Thyroid: No thyromegaly. Trachea: Trachea normal.   Cardiovascular:      Rate and Rhythm: Normal rate and regular rhythm. No extrasystoles are present. Chest Wall: PMI is not displaced. Heart sounds: Normal heart sounds. No murmur heard. No friction rub. No gallop.    Pulmonary:      Effort: Pulmonary effort is normal. No accessory muscle usage or respiratory distress. Breath sounds: Normal breath sounds. Musculoskeletal:      Cervical back: Normal range of motion and neck supple. Lymphadenopathy:      Head:      Right side of head: No submental, submandibular, tonsillar, preauricular, posterior auricular or occipital adenopathy. Left side of head: No submental, submandibular, tonsillar, preauricular, posterior auricular or occipital adenopathy. Cervical: No cervical adenopathy. Upper Body:      Right upper body: No supraclavicular adenopathy. Left upper body: No supraclavicular adenopathy. Skin:     General: Skin is warm and dry. Coloration: Skin is not pale. Findings: No rash. Comments: Skin intact, warm and dry to touch, no rashes noted on exposed surfaces. Neurological:      Mental Status: He is alert and oriented to person, place, and time. He is not disoriented. Psychiatric:         Mood and Affect: Mood normal.         Behavior: Behavior is cooperative. DIAGNOSTIC RESULTS   Labs:No results found for this visit on 11/15/22. IMAGING:  No orders to display      URGENT CARE COURSE:     Vitals:    11/15/22 0951   BP: 110/71   Pulse: 79   Resp: 16   Temp: 97.8 °F (36.6 °C)   TempSrc: Temporal   SpO2: 99%   Weight: 152 lb (68.9 kg)       Medications - No data to display  PROCEDURES:  None  FINAL IMPRESSION      1. Acute rhinitis        DISPOSITION/PLAN   DISPOSITION Decision To Discharge 11/15/2022 10:37:15 AM    Nontoxic, no distress. Exam consistent with acute rhinitis. Resume steroid medication. Medication as prescribed. Sinus rinse twice daily. If symptoms worsen return or go to ER. PATIENT REFERRED TO:  Daisha Tran,   [address] [place], [place] [address]      Resume steroid medication. Medication as prescribed. If symptoms worsen return or go to ER.     DISCHARGE MEDICATIONS:  Discharge Medication List as of 11/15/2022 10:38 AM        START taking these medications Details   cetirizine-psuedoephedrine (ZYRTEC-D) 5-120 MG per extended release tablet Take 1 tablet by mouth 2 times daily for 7 days, Disp-14 tablet, R-0Normal           Discharge Medication List as of 11/15/2022 10:38 AM          STEFFANIE Weaver CNP, APRN - CNP  11/15/22 1101 no